# Patient Record
Sex: FEMALE | Race: WHITE
[De-identification: names, ages, dates, MRNs, and addresses within clinical notes are randomized per-mention and may not be internally consistent; named-entity substitution may affect disease eponyms.]

---

## 2017-09-09 ENCOUNTER — HOSPITAL ENCOUNTER (EMERGENCY)
Dept: HOSPITAL 25 - UCCORT | Age: 67
Discharge: HOME | End: 2017-09-09
Payer: MEDICARE

## 2017-09-09 VITALS — SYSTOLIC BLOOD PRESSURE: 145 MMHG | DIASTOLIC BLOOD PRESSURE: 81 MMHG

## 2017-09-09 DIAGNOSIS — G45.9: ICD-10-CM

## 2017-09-09 DIAGNOSIS — E66.9: ICD-10-CM

## 2017-09-09 DIAGNOSIS — H53.8: ICD-10-CM

## 2017-09-09 DIAGNOSIS — E78.5: ICD-10-CM

## 2017-09-09 DIAGNOSIS — R51: ICD-10-CM

## 2017-09-09 DIAGNOSIS — J44.9: ICD-10-CM

## 2017-09-09 DIAGNOSIS — Z87.891: ICD-10-CM

## 2017-09-09 DIAGNOSIS — R23.3: Primary | ICD-10-CM

## 2017-09-09 DIAGNOSIS — I10: ICD-10-CM

## 2017-09-09 DIAGNOSIS — Z88.7: ICD-10-CM

## 2017-09-09 DIAGNOSIS — Z88.8: ICD-10-CM

## 2017-09-09 PROCEDURE — 99212 OFFICE O/P EST SF 10 MIN: CPT

## 2017-09-09 PROCEDURE — G0463 HOSPITAL OUTPT CLINIC VISIT: HCPCS

## 2017-09-09 NOTE — UC
Eye Complaint HPI





- HPI Summary


HPI Summary: 


Bruising and swelling this morning starting after 7 AM.





- History of Current Complaint


Chief Complaint: UCEye


Stated Complaint: LEFT EYE BRUISING,HEADACHE


Time Seen by Provider: 09/09/17 17:52


Hx Obtained From: Patient


Hx Last Menstrual Period: n/a


Onset/Duration: Sudden Onset - unaware of any trauma., Still Present, Worse 

Since - onset with more bruising.


Timing: Constant


Severity Initially: Mild


Severity Currently: Mild


Pain Intensity: 0


Location of Injury: Eye Lid (upper), Periorbital - on the medial aspect


Aggravating Factor(s): Nothing


Alleviating Factor(s): Nothing


Associated Signs And Symptoms: Positive: Vision Impairment Left - just 

transient blurring that clears with blinking.





- Risk Factors


Penetrating Injury Risk Factor: Negative


Globe Rupture Risk Factors: Negative


Acute Glaucoma Risk Factors: Negative


Optic Artery Occlusion Risk Factors: Heart Disease





- Allergies/Home Medications


Allergies/Adverse Reactions: 


 Allergies











Allergy/AdvReac Type Severity Reaction Status Date / Time


 


Pseudoephedrine Allergy Severe racing Verified 09/09/17 17:37





[From Sudafed]   heartbeat  


 


Tetanus Toxoid Allergy Severe rash and Verified 09/09/17 17:37





   edema  











Home Medications: 


 Home Medications





Calcium Carbonate-Cholecalcife [Calcium 600 + D 600-200 mg-Unit] 1 tab PO BID 09 /09/17 [History Confirmed 09/09/17]


Furosemide TAB* [Lasix TAB*] 20 mg PO DAILY 09/09/17 [History Confirmed 09/09/17

]


Multivitamins/Minerals TAB* [Thera M Plus TAB*] 1 tab PO DAILY 09/09/17 [

History Confirmed 09/09/17]











PMH/Surg Hx/FS Hx/Imm Hx


Endocrine History: Dyslipidemia


Cardiovascular History: Hypertension


Respiratory History: COPD


Neurological History: TIA





- Surgical History


Surgical History: Yes


Surgery Procedure, Year, and Place: Tonsilectomy, knee surgery, tubal, 

hysterectomy, ehsan





- Family History


Known Family History: Positive: Cardiac Disease, Hypertension, Diabetes





- Social History


Occupation: Retired


Lives: With Family


Alcohol Use: None


Substance Use Type: None


Smoking Status (MU): Former Smoker


Type: Cigarettes


Amount Used/How Often: 1 PPD


Length of Time of Smoking/Using Tobacco: 46 Years


Have You Smoked in the Last Year: No


When Did the Patient Quit Smoking/Using Tobacco: 4 YRS





- Immunization History


Most Recent Influenza Vaccination: October 2015


Most Recent Pneumonia Vaccination: October 2015





Review of Systems


Skin: Bruising


Eyes: Blurred Vision


Neurological: Headache - frontal headache early this afternoon that has 

resolved after tylenol and eating.


Is Patient Immunocompromised?: No


All Other Systems Reviewed And Are Negative: Yes





Physical Exam


Triage Information Reviewed: Yes


Appearance: Well-Appearing, No Pain Distress, Obese


Vital Signs: 


 Initial Vital Signs











Temp  98.1 F   09/09/17 17:38


 


Pulse  81   09/09/17 17:38


 


Resp  18   09/09/17 17:38


 


BP  145/81   09/09/17 17:38


 


Pulse Ox  100   09/09/17 17:38











Vital Signs Reviewed: Yes


Eyes: Positive: Conjunctiva Clear, Other: - Bilateral cataracts. Fundiscopic 

exam normal. Discs sharp.


ENT: Positive: Pharynx normal, TMs normal


Neck exam: Normal


Respiratory: Positive: Lungs clear, Decreased breath sounds


Cardiovascular Exam: Normal


Musculoskeletal Exam: Normal


Neurological Exam: Normal


Psychological Exam: Normal


Skin: Positive: Other - bruising over left upper eyelid medially, tracking down 

onto the lower lid





Eye Complaint Course/Dx





- Differential Dx/Diagnosis


Differential Diagnosis/HQI/PQRI: Conjunctivitis, Periorbital Cellulitis, Uveitis


Provider Diagnoses: Ecchymosis eyelid





Discharge





- Discharge Plan


Condition: Stable


Disposition: HOME


Patient Education Materials:  Ecchymosis (ED), Hematoma (ED)


Additional Instructions: 


Use ice packs to the area and rest. To the ER if worse.

## 2019-08-09 ENCOUNTER — HOSPITAL ENCOUNTER (EMERGENCY)
Dept: HOSPITAL 25 - UCCORT | Age: 69
Discharge: HOME | End: 2019-08-09
Payer: MEDICARE

## 2019-08-09 VITALS — SYSTOLIC BLOOD PRESSURE: 133 MMHG | DIASTOLIC BLOOD PRESSURE: 76 MMHG

## 2019-08-09 DIAGNOSIS — Z87.891: ICD-10-CM

## 2019-08-09 DIAGNOSIS — J44.9: ICD-10-CM

## 2019-08-09 DIAGNOSIS — L03.116: Primary | ICD-10-CM

## 2019-08-09 PROCEDURE — 99212 OFFICE O/P EST SF 10 MIN: CPT

## 2019-08-09 PROCEDURE — G0463 HOSPITAL OUTPT CLINIC VISIT: HCPCS

## 2019-08-09 NOTE — UC
Lower Extremity/Ankle HPI





- HPI Summary


HPI Summary: 





Pt presents with c/o of worsening redness and pain on left mid anterior shin.  

Pt states that she tripped walking up stairs in RV two weeks ago and hit left 

shin on edge of step.  Pt states that area has become increasingly red and 

tender over the last two weeks. 





- History of Current Complaint


Chief Complaint: UCLowerExtremity


Stated Complaint: LEFT LEG INJURY


Time Seen by Provider: 08/09/19 12:06


Hx Obtained From: Patient


Hx Last Menstrual Period: n/a


Pregnant?: No


Onset/Duration: Gradual Onset, Still Present, Worse Since - osnet


Severity Initially: Mild


Severity Currently: Moderate


Pain Intensity: 5


Aggravating Factor(s): Standing, Ambulation


Alleviating Factor(s): Nothing


Able to Bear Weight: Yes





- Risk Factors


Gout Risk Factors: Obesity


DVT Risk Factors: Negative


Septic Arthritis Risk Factor: Negative





- Allergies/Home Medications


Allergies/Adverse Reactions: 


 Allergies











Allergy/AdvReac Type Severity Reaction Status Date / Time


 


pseudoephedrine Allergy  Tachycardia Verified 08/09/19 12:18





[From Sudafed]     


 


Tetanus Vaccines and Toxoid Allergy  Rash Verified 08/09/19 12:18











Home Medications: 


 Home Medications





Calcium Carbonate [Calcium] 500 mg PO DAILY 08/09/19 [History Confirmed 08/09/19

]


Fluticasone-Salmeterol 500-50* [Advair Diskus 500-50*] 1 puff INH BID 08/09/19 [

History Confirmed 08/09/19]


Ramipril 1.25 mg PO DAILY 08/09/19 [History Confirmed 08/09/19]


Umeclidinium Bromide [Incruse Ellipta] 62.5 mcg IH DAILY 08/09/19 [History 

Confirmed 08/09/19]











PMH/Surg Hx/FS Hx/Imm Hx


Previously Healthy: Yes


Cardiovascular History: Cardiac Disease


Respiratory History: COPD





- Surgical History


Surgical History: Yes


Surgery Procedure, Year, and Place: Tonsilectomy, knee surgery, tubal, 

hysterectomy, ehsan





- Family History


Known Family History: Positive: Cardiac Disease, Hypertension, Diabetes





- Social History


Occupation: Retired


Lives: With Family


Alcohol Use: Occasionally


Substance Use Type: None


Smoking Status (MU): Former Smoker


Type: Cigarettes


Amount Used/How Often: 1 PPD


Length of Time of Smoking/Using Tobacco: 46 Years


Have You Smoked in the Last Year: No


When Did the Patient Quit Smoking/Using Tobacco: 4 YRS





- Immunization History


Most Recent Influenza Vaccination: October 2015


Most Recent Pneumonia Vaccination: October 2015





Review of Systems


All Other Systems Reviewed And Are Negative: Yes


Constitutional: Positive: Negative


Skin: Positive: Bruising, Other - erythema, tenderness,


Eyes: Positive: Negative


ENT: Positive: Negative


Respiratory: Positive: Negative


Cardiovascular: Positive: Negative


Gastrointestinal: Positive: Negative


Genitourinary: Positive: Negative


Motor: Positive: Negative


Neurovascular: Positive: Negative


Musculoskeletal: Positive: Myalgia - left shin


Neurological: Positive: Negative


Psychological: Positive: Negative


Is Patient Immunocompromised?: No





Physical Exam


Triage Information Reviewed: Yes


Appearance: Obese


Vital Signs: 


 Initial Vital Signs











Temp  97.8 F   08/09/19 12:10


 


Pulse  85   08/09/19 12:10


 


Resp  16   08/09/19 12:10


 


BP  133/76   08/09/19 12:10


 


Pulse Ox  100   08/09/19 12:10











Vital Signs Reviewed: Yes


Eye Exam: Normal


ENT Exam: Normal


ENT: Positive: Hearing grossly normal


Dental Exam: Normal


Neck exam: Normal


Respiratory Exam: Normal


Musculoskeletal Exam: Normal


Neurological Exam: Normal


Psychological Exam: Normal


Skin Exam: Other - erythema ~ 6 cm in diamter, mild warmth greater than 

surroudning skin, healing bruise





Diagnostics





- Radiology


  ** No standard instances


Radiology Interpretation Completed By: Radiologist - REPORT AND IMPRESSION: #. 

Negative for fracture or evidence for stress reaction. #. Normal articular 

alignment. #. Mild osteoarthritis at the medial joint compartment of the knee 

with associated chondrocalcinosis at the meniscus as well as subchondral 

sclerosis at the tibial plateau.  #. Nonfocal moderate soft tissue edema. 

Negative for subcutaneous emphysema.





Lower Extremity Course/Dx





- Differential Dx/Diagnosis


Differential Diagnosis/HQI/PQRI: Cellulitis, Contusion, Fracture (Closed)


Provider Diagnosis: 


 Cellulitis of left anterior lower leg








Discharge





- Sign-Out/Discharge


Documenting (check all that apply): Patient Departure


All imaging exams completed and their final reports reviewed: No Studies





- Discharge Plan


Condition: Stable


Disposition: HOME


Prescriptions: 


Cephalexin CAP* [Keflex 500 CAP*] 500 mg PO Q12H #10 cap


Patient Education Materials:  Cellulitis (ED)


Referrals: 


Gina Arredondo PA [Primary Care Provider] - If Needed





- Billing Disposition and Condition


Condition: STABLE


Disposition: Home

## 2019-08-11 NOTE — UC
- Progress Note


Progress Note: 





Patient Name:         LILI JONES                                            

                        Medical Record#: [id number]


Ordering Physician: Lynne Cespedes NP                                  

                        Acct.#: [id number]


:     1950         Age: 68   Sex: F                                   

                        Location: [facility]


Exam Date: 19 1227                                                       

                        ADM Status: REG ER


Order Information:                         TIBIA FIBULA LEFT


Accession Number:                          [id number]


CPT:                                       [id number]


Indication: Anterior lateral LEFT lower leg pain post fall one week ago.





Comparison: May 07, 2012





Technique: AP and lateral views LEFT lower leg.





REPORT AND IMPRESSION: 


#.  Negative for fracture or evidence for stress reaction. 


#. Normal articular alignment. 


#.  Mild osteoarthritis at the medial joint compartment of the knee with 

associated


chondrocalcinosis at the meniscus as well as subchondral sclerosis at the 

tibial plateau. 





#. Nonfocal moderate soft tissue edema. Negative for subcutaneous emphysema.








____________________________________________________________


<Electronically signed by Vince Fall MD in OV>  19 1257


Dictated By: Vince Fall MD


Dictated Date/Time: 19 1257


Transcribed Date/Time: 19 1254


Copy to:











CC:Lynne Cespedes NP; Gina CHAMBERS; Roslyn Yuan MD


Imaging - [facility]                                 Imaging [facility] 


[address]                                       [address] [address]


ph [phone])                                     ph ([phone])        

                                        ph ([phone]) 












































This report is only to be considered final once signed by the Provider(s) as 

displayed in the "<Electronically Signed by >" field (s). Absence of a 


signature indicates the report is in a draft status and still needs to be 

finalized. In the event this document was created by someone other than the 


signing Provider, the individual initiating the document will be listed in the 

"Entered by:" or "Dictated by:" fields.


                                                                 1 of 1








Course/Dx





- Diagnoses


Provider Diagnoses: 


 Cellulitis of left anterior lower leg








Discharge





- Sign-Out/Discharge


Documenting (check all that apply): Post-Discharge Follow Up


All imaging exams completed and their final reports reviewed: Yes





- Discharge Plan


Condition: Stable


Disposition: HOME


Prescriptions: 


Cephalexin CAP* [Keflex 500 CAP*] 500 mg PO Q12H #10 cap


Patient Education Materials:  Cellulitis (ED)


Referrals: 


Gina Arredondo PA [Primary Care Provider] - If Needed





- Billing Disposition and Condition


Condition: STABLE


Disposition: Home

## 2020-01-27 ENCOUNTER — HOSPITAL ENCOUNTER (EMERGENCY)
Dept: HOSPITAL 25 - UCCORT | Age: 70
Discharge: HOME | End: 2020-01-27
Payer: MEDICARE

## 2020-01-27 VITALS — DIASTOLIC BLOOD PRESSURE: 80 MMHG | SYSTOLIC BLOOD PRESSURE: 141 MMHG

## 2020-01-27 DIAGNOSIS — J20.9: ICD-10-CM

## 2020-01-27 DIAGNOSIS — Z87.891: ICD-10-CM

## 2020-01-27 DIAGNOSIS — Z88.8: ICD-10-CM

## 2020-01-27 DIAGNOSIS — Z88.7: ICD-10-CM

## 2020-01-27 DIAGNOSIS — J44.0: Primary | ICD-10-CM

## 2020-01-27 PROCEDURE — G0463 HOSPITAL OUTPT CLINIC VISIT: HCPCS

## 2020-01-27 PROCEDURE — 99212 OFFICE O/P EST SF 10 MIN: CPT

## 2020-01-27 NOTE — XMS REPORT
Continuity of Care Document (CCD)

 Created on:2020



Patient:Stefania Acevedo

Sex:Female

:1950

External Reference #:[id number]





Demographics







 Address  [address] [place] [address]

 

 Mobile Phone  [phone]

 

 Preferred Language  en

 

 Marital Status  Not  or 

 

 Jewish Affiliation  Unknown

 

 Race  White

 

 Ethnic Group  Not  or 









Author







 Name  REYES Phillips (transmitted by agent of provider Arik Hurley)

 

 Address  [address] [place] [address]









Care Team Providers







 Name  Role  Phone

 

 Gina Arredondo RPA - Medical  Care Team Information   [phone]
[phone]

 

 Kali Loco M.D. - Internal  Care Team Information   [phone]



 Medicine    









Problems







 Active Problems  Provider  Date

 

 Obstructive sleep apnea syndrome  REYES Phillips  Onset: 2019









 Note: Apap









 Coronary atherosclerosis  Gina Arredondo PA  Onset: 2019

 

 Essential hypertension  Gina Arredondo PA  Onset: 2019

 

 Chronic obstructive lung disease  Gina Arredondo PA  Onset: 2019

 

 Hyperlipidemia  iGna Arredondo PA  Onset: 2019

 

 H/O: TIA  Gina Arredondo PA  Onset: 2019

 

 Gastroesophageal reflux disease  Gina Arredondo PA  Onset: 2019

 

 Hyperglycemia  Gina Arredondo PA  Onset: 2019









 Note: noted 









 History of adenomatous polyp of colon  Gina Arredondo PA  Onset: 2019









 Note: high grade  2014









 Gastric hemorrhage  Gina Arredondo PA  Onset: 2019









 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









 Clostridium difficile infection  Gina Arredondo PA  Onset: 2019

 

 Chronic diastolic heart failure  Gina Arredondo PA  Onset: 2019

 

 Diverticulitis of sigmoid colon  Gina Arredondo PA  Onset: 2019









 Note: 2019









 Polyp of colon  Gina Arredondo, PA  Onset: 2019









 Note: tubular adenomas 2019







Social History







 Type  Date  Description  Comments

 

 Birth Sex    Unknown  

 

 ETOH Use    Consumed 2 glasses of wine  



     per day in the past  

 

 Tobacco Use  Start: Unknown End:  Patient is a former smoker  Quit  pack 
years



   Unknown    40

 

 Smoking Status  Reviewed: 20  Patient is a former smoker  Quit 2010 pack 
years



       40







Allergies, Adverse Reactions, Alerts







 Active Allergies  Reaction  Severity  Comments  Date

 

 Tetanus        2019

 

 Pseudoephedrine        2019

 

 Sudafed        2019

 

 Lipitor        2019

 

 Adhesive        2019







Medications







 Active Medications  SIG  Qnty  Indications  Ordering  Date



         Provider  

 

 Amoxicillin/Clavulan  one pill with food  20tabs  K57.32  Noman  2020



 ate Potassium  twice a day      MD Chidi  



           



 875-125mg Tablets          



           

 

 Flagyl  1 tab by mouth  15tabs  K57.32  Noman  2020



       500mg Tablets  every 8 hours      MD Chidi  



           

 

 Oxygen Order  Portable system    R09.02  Noman  10/30/2019



   requiring tank      MD Chidi  



   delivery 4 liters        



   via nc while        



   ambulatory 3 liters        



   via nc while        



   sleeping        

 

 Order  Oxygen @ 3L/min,      Noman  2019



   continuous      MD Chidi  

 

 Fluticasone  2 sprays to each      Noman  2019



 Propionate  nare every day      MD Chidi  



           50mcg/Act          



 Suspension          



           

 

 Ramipril  Take 1 Capsule By  90caps  I10  Noman  2019



         1.25mg  Mouth Every Day      MD Chidi  



 Capsules          



           

 

 Oxygen Thru [facility]  @ 3 liters via NC,      Noman  2019



 Homecare  continuous  And      MD Chidi  



   with c-pap        

 

 Albuterol Sulfate  1 application every  75ml    Noman  2019



   4 hours as needed      MD Chidi  



 (2.5mg/3ML) 0.083%          



 Nebulizer          



           

 

 Aspirin Ec Low Dose  1 by mouth every  30tabs    Noman  2019



   day      MD Chidi  



 81mg Tablets           



           

 

 Vitamin C  1 by mouth every      Noman  2019



          1000mg  day      MD Chidi  



 Tablets          



           

 

 Calcium 500 +D  1 by mouth twice a  180tabs    Noman  2019



   day      MD Chidi  



 [id number]          



 Tablets          



           

 

 Pantoprazole Sodium  Take 1 Tablet By  180tabs    Noman  2019



   Mouth Twice Daily      MD Chidi  



 40mg Tablets           



           

 

 Montelukast Sodium  1 by mouth every  90tabs    Noman  2019



   day      MD Chidi  



 10mg Tablets          



           

 

 Pravastatin Sodium  Take 1 Tablet By  90tabs    Noman  2019



   Mouth Every Evening      MD Chidi  



 40mg Tablets          



           

 

 Furosemide  1 by mouth every      Noman  2019



           20mg  day      MD Chidi  



 Tablets          



           

 

 Daliresp  1 by mouth every      Kali Loco,  



         500mcg  day      M.D.  



 Tablets          



           

 

 Incruse Ellipta  inhale 1 puff by  90units    [place]  



   mouth every 24      MD Chidi  



 62.5mcg/Inh Aerosol  hours        



           

 

 Proair HFA  1-2 inhalations  8.500gm    Noman  



   every 4-6 hours as      MD Chidi  



 108(90Base) mcg/Act  needed        



 Aerosol          



           









 History Medications









 Azithromycin  2 tabs by  11tabs  N83.201  Noman [place],  10/07/2019 -



             250mg  mouth today      MD  10/20/2019



 Tablets  then 1 tab by        



   mouth daily        







Immunizations







 Description

 

 No Information Available







Vital Signs







 Date  Vital  Result  Comment

 

 2020  2:02pm  Height  62.5 inches  5'2.50"









 Weight  227.12 lb  

 

 Heart Rate  96 /min  

 

 BP Systolic Sitting  136 mmHg  

 

 BP Diastolic Sitting  72 mmHg  

 

 Body Temperature  98.5 F  

 

 O2 % BldC Oximetry  97 %  

 

 BMI (Body Mass Index)  40.9 kg/m2  









 10/30/2019  9:48am  Weight  221.12 lb  









 Heart Rate  80 /min  

 

 BP Systolic  118 mmHg  

 

 BP Diastolic  80 mmHg  

 

 Body Temperature  98.8 F  

 

 O2 % BldC Oximetry  98 %  







Results







 Description

 

 No Information Available







Procedures







 Date  Code  Description  Status

 

 2019  32811000  Colonoscopy  Completed

 

 2019  37913509  Mammogram  Completed

 

 2018  68062333  Mammogram  Completed







Medical Devices







 Description

 

 No Information Available







Encounters







 Type  Date  Location  Provider  Dx  Diagnosis

 

 Office Visit  10/30/2019  [facility]  Gina  N83.201  Unspecified 
ovarian



   9:30a    [place]    cyst, right side









 J44.9  Chronic obstructive pulmonary disease, unspecified









 Office Visit  10/07/2019  1:27p  [facility]  Gina  J44.0  Chr 
obstructive



     Walk-in [place]  [place]    pulmon disease



     [facility]      with (acute) lower



           resp infct









 R09.1  Pleurisy









 Office Visit  2019  9:30a  [facility]  Gina  J44.9  Chronic



     [place]  [place]    obstructive



           pulmonary disease,



           unspecified









 N83.201  Unspecified ovarian cyst, right side

 

 K57.32  Dvtrcli of lg int w/o perforation or abscess w/o bleeding









 Office Visit  2019  1:00p  [facility]  Gina  K57.32  Dvtrcli of lg 
int



     Care  [place]    w/o perforation or



           abscess w/o



           bleeding









 N83.201  Unspecified ovarian cyst, right side







Assessments







 Date  Code  Description  Provider

 

 2020  K57.32  Diverticulitis of large intestine without  Gina [place]
, [place]



     perforation or abscess without bleeding  

 

 10/30/2019  N83.201  Unspecified ovarian cyst, right side  Gina [place], [place]

 

 10/30/2019  J44.9  Chronic obstructive pulmonary disease,  Gina [place], [place]



     unspecified  

 

 10/07/2019  J44.0  Chronic obstructive pulmonary disease with  Gina [place]
, [place]



     (acute) lower respiratory infection  

 

 10/07/2019  R09.1  Pleurisy  Gina [place], [place]

 

 2019  J44.9  Chronic obstructive pulmonary disease,  Gina [place], [place]



     unspecified  

 

 2019  N83.201  Unspecified ovarian cyst, right side  Gina [place], [place]

 

 2019  K57.32  Diverticulitis of large intestine without  Gina [place]
, [place]



     perforation or abs  

 

 2019  K57.32  Diverticulitis of large intestine without  Gina [place]
, [place]



     perforation or abs  

 

 2019  N83.201  Unspecified ovarian cyst, right side  Gina [place], [place]







Plan of Treatment

Future Appointment(s):2020 10:00 am - Gina Arredondo PA at [facility] 
Care2020 - Gina [place], PAK57.32 Diverticulitis of large intestine 
without perforation or abscess without bleedingNew Medication:Amoxicillin/
Clavulanate Potassium 875-125 mg - one pill with food twice a dayFlagyl 500 mg 
- 1 tab by mouth every 8 hoursNew Labs:CBC W/Auto Diff, Ordered: 20



Functional Status







 Functional Condition  Comment  Date  Status

 

 Cpap      Active

 

 Nebulizer      Active

 

 Oxygen      Active







Mental Status







 Description

 

 No Information Available







Referrals







 Description

 

 No Information Available

## 2020-03-23 ENCOUNTER — HOSPITAL ENCOUNTER (EMERGENCY)
Dept: HOSPITAL 25 - UCCORT | Age: 70
Discharge: HOME HEALTH | End: 2020-03-23
Payer: MEDICARE

## 2020-03-23 VITALS — DIASTOLIC BLOOD PRESSURE: 87 MMHG | SYSTOLIC BLOOD PRESSURE: 157 MMHG

## 2020-03-23 DIAGNOSIS — Z99.81: ICD-10-CM

## 2020-03-23 DIAGNOSIS — Z87.891: ICD-10-CM

## 2020-03-23 DIAGNOSIS — J44.9: ICD-10-CM

## 2020-03-23 DIAGNOSIS — Z20.828: ICD-10-CM

## 2020-03-23 DIAGNOSIS — Z79.82: ICD-10-CM

## 2020-03-23 DIAGNOSIS — Z79.51: ICD-10-CM

## 2020-03-23 DIAGNOSIS — Z88.7: ICD-10-CM

## 2020-03-23 DIAGNOSIS — R06.02: Primary | ICD-10-CM

## 2020-03-23 DIAGNOSIS — Z88.8: ICD-10-CM

## 2020-03-23 PROCEDURE — U0002 COVID-19 LAB TEST NON-CDC: HCPCS

## 2020-03-23 PROCEDURE — G0463 HOSPITAL OUTPT CLINIC VISIT: HCPCS

## 2020-03-23 PROCEDURE — 99212 OFFICE O/P EST SF 10 MIN: CPT

## 2020-03-23 NOTE — XMS REPORT
Continuity of Care Document (CCD)

 Created on:2020



Patient:Stefania Acevedo

Sex:Female

:1950

External Reference #:[id number]





Demographics







 Address  [address] [place] [address]

 

 Mobile Phone  [phone]

 

 Preferred Language  en

 

 Marital Status  Not  or 

 

 Restorationism Affiliation  Unknown

 

 Race  White

 

 Ethnic Group  Not  or 









Author







 Name  REYES Phillips (transmitted by agent of provider Kayy Bentley)

 

 Address  [address] [place] [address]









Care Team Providers







 Name  Role  Phone

 

 Gina Arredondo RPA - Medical  Care Team Information   [phone]
[phone]

 

 Kali Loco M.D. - Internal  Care Team Information   [phone]



 Medicine    

 

 Kathy Brady MD -  Care Team Information   [phone]



 Gastroenterology    









Problems







 Active Problems  Provider  Date

 

 Obstructive sleep apnea syndrome  REYES Phillips  Onset: 2019









 Note: Apap









 Coronary atherosclerosis  Gina Arredondo PA  Onset: 2019

 

 Essential hypertension  Gina Arredondo PA  Onset: 2019

 

 Chronic obstructive lung disease  Gina Arredondo PA  Onset: 2019

 

 Hyperlipidemia  Gina Arredondo PA  Onset: 2019

 

 H/O: TIA  Gina Arredondo PA  Onset: 2019

 

 Gastroesophageal reflux disease  Gina Arredondo PA  Onset: 2019

 

 Hyperglycemia  Gina Arredondo PA  Onset: 2019









 Note: noted 









 History of adenomatous polyp of colon  Gina Arredondo PA  Onset: 2019









 Note: high grade  2014









 Gastric hemorrhage  Gina Arredondo PA  Onset: 2019









 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









 Clostridium difficile infection  Gina Arredondo PA  Onset: 2019

 

 Chronic diastolic heart failure  Gina Arredondo PA  Onset: 2019

 

 Diverticulitis of sigmoid colon  Gina Arredondo PA  Onset: 2019









 Note: 2019









 Polyp of colon  Gina Arredondo, PA  Onset: 2019









 Note: tubular adenomas 2019







Social History







 Type  Date  Description  Comments

 

 Birth Sex    Unknown  

 

 ETOH Use    Consumed 2 glasses of wine  



     per day in the past  

 

 Tobacco Use  Start: Unknown End:  Patient is a former smoker  Quit 2010 pack 
years



   Unknown    40

 

 Smoking Status  Reviewed: 20  Patient is a former smoker  Quit 2010 pack 
years



       40







Allergies, Adverse Reactions, Alerts







 Active Allergies  Reaction  Severity  Comments  Date

 

 Tetanus        2019

 

 Pseudoephedrine        2019

 

 Sudafed        2019

 

 Lipitor        2019

 

 Adhesive        2019







Medications







 Active Medications  SIG  Qnty  Indications  Ordering  Date



         Provider  

 

 Acidophilus  1 by mouth twice a      Noman  2020



 Lactobacillus  day      MD Chidi  



              Capsules          



           

 

 Furosemide  1 by mouth every      Noman  2019



          20mg Tablets  day      MD Chidi  



           

 

 Pravastatin Sodium  Take 1 Tablet By  90tabs    Noman  2019



                  40mg  Mouth Every      MD Chidi  



 Tablets  Evening        



           

 

 Montelukast Sodium  1 by mouth every  90tabs    Noman  2019



                  10mg  day      MD Chidi  



 Tablets          



           

 

 Pantoprazole Sodium  Take 1 Tablet By  180tabs    Noman  2019



   Mouth Twice Daily      MD Chidi  



 40mg Tablets           



           

 

 Calcium 500 +D  1 by mouth twice a  180tabs    Noman  2019



   day      MD Chidi  



 [id number]          



 Tablets          



           

 

 Vitamin C  1 by mouth every      Noman  2019



         1000mg  day      MD Chidi  



 Tablets          



           

 

 Aspirin Ec Low Dose  1 by mouth every  30tabs    Noman  2019



   day      MD Chidi  



 81mg Tablets           



           

 

 Albuterol Sulfate  1 application  75ml    Noman  2019



   every 4 hours as      MD Chidi  



 (2.5mg/3ML) 0.083%  needed        



 Nebulizer          



           

 

 Oxygen Thru [facility]  @ 3 liters via NC,      Noman  2019



 Homecare  continuous  And      MD Chidi  



   with c-pap        

 

 Ramipril  Take 1 Capsule By  90caps  I10  2019



        1.25mg  Mouth Every Day      MD Chidi  



 Capsules          



           

 

 Fluticasone  2 sprays to each      Noman  2019



 Propionate  nare every day      MD Chidi  



          50mcg/Act          



 Suspension          



           

 

 Benefiber  daily      Unknown  



          Powder          



           

 

 Probiotic Acidophilus  1 by mouth every      Unknown  



   day -bid        



 Capsules          



           

 

 Proair HFA  1-2 inhalations  8.500gm    Noman  



          108(90Base)  every 4-6 hours as      MD Chidi  



 mcg/Act Aerosol  needed        



           

 

 Incruse Ellipta  inhale 1 puff by  90units    Noman  



   mouth every 24      MD Chidi  



 62.5mcg/Inh Aerosol  hours        



           

 

 Daliresp  1 by mouth every      Kheti, Kali,  



        500mcg Tablets  day      M.D.  



           









 History Medications









 Ciprofloxacin HCL  1 tab by mouth      Kathy Brady  2020 -



                 500mg  twice a day      F., MD  2020



 Tablets          



           

 

 Metronidazole  one tablet by      Kathy Brady  2020 -



             500mg  mouth 3 times      F., MD  2020



 Tablets  daily for 7 days        



           

 

 Prednisone  4 tablets by mouth      Unknown  2020 -



          10mg Tablets  for 3 days,3        2020



   tablets by mouth        



   for 3 days, 2        



   tablets by mouth        



   for 3 days, 1        



   tablet by mouth        



   for 3 days        

 

 Amoxicillin/Clavulana  one pill with food  20tabs  K57.32  Noman  2020 -



 te Potassium  twice a day      MD Chidi  2020



            875-125mg          



 Tablets          



           

 

 Flagyl  1 tab by mouth  15tabs  K57.32  Noman  2020 -



      500mg Tablets  every 8 hours      MD Chidi  2020



           

 

 Oxygen Order  Portable system    R09.02  Noman  10/30/2019 -



   requiring tank      MD Chidi  2020



   delivery 4 liters        



   via nc while        



   ambulatory 3        



   liters via nc        



   while sleeping        

 

 Azithromycin  2 tabs by mouth  11tabs  N83.201  Noman  10/07/2019 -



            250mg  today then 1 tab      MD Chidi  10/20/2019



 Tablets  by mouth daily        



           

 

 Order  Oxygen @ 3L/min,      Noman  2019 -



   continuous      MD Chidi  2020







Immunizations







 Description

 

 No Information Available







Vital Signs







 Date  Vital  Result  Comment

 

 2020 10:56am  Height  62.5 inches  5'2.50"









 Weight  234.25 lb  

 

 Heart Rate  90 /min  

 

 BP Systolic Sitting  148 mmHg  

 

 BP Diastolic Sitting  80 mmHg  

 

 Body Temperature  98.2 F  

 

 O2 % BldC Oximetry  95 %  O2 on 3L

 

 BMI (Body Mass Index)  42.2 kg/m2  









 2020 11:32am  Height  62.5 inches  5'2.50"









 Weight  219.12 lb  

 

 Heart Rate  93 /min  

 

 BP Systolic Sitting  136 mmHg  

 

 BP Diastolic Sitting  80 mmHg  

 

 O2 % BldC Oximetry  97 %  on 3L O2 via nasal cannula

 

 BMI (Body Mass Index)  39.4 kg/m2  







Results







 Test  Acquired Date  Facility  Test  Result  H/L  Range  Note

 

 CBC W/Auto  2020  [facility]  White Blood  9.0 10^3/uL  
Normal  3.5-10.8  



 Diff    [address]  Count        



     [place] [address] [phone]          









 Red Blood Count  4.60 10^6/uL  Normal  3.70-4.87  

 

 Hemoglobin  12.1 g/dL  Normal  12.0-16.0  

 

 Hematocrit  38 %  Normal  35-47  

 

 Mean Corpuscular Volume  83 fL  Normal  80-97  

 

 Mean Corpuscular Hemoglobin  26 pg  Low  27-31  

 

 Mean Corpuscular HGB Conc  32 g/dL  Normal  31-36  

 

 Red Cell Distribution Width  15 %  Normal  10-15  

 

 Platelet Count  285 10^3/uL  Normal  150-450  

 

 Mean Platelet Volume  10.0 fL  Normal  7.4-10.4  

 

 Abs Neutrophils  6.3 10^3/uL  Normal  1.5-7.7  

 

 Abs Lymphocytes  1.6 10^3/uL  Normal  1.0-4.8  

 

 Abs Monocytes  0.9 10^3/uL  High  0-0.8  

 

 Abs Eosinophils  0.1 10^3/uL  Normal  0-0.6  

 

 Abs Basophils  0.1 10^3/uL  Normal  0-0.2  

 

 Abs Nucleated RBC  0.0 10^3/uL      

 

 Granulocyte %  70.2 %      

 

 Lymphocyte %  17.8 %      

 

 Monocyte %  10.0 %      

 

 Eosinophil %  1.4 %      

 

 Basophil %  0.6 %      

 

 Nucleated Red Blood Cells %  0.0      







Procedures







 Date  Code  Description  Status

 

 2019  41563097  Colonoscopy  Completed

 

 2019  [id number]  Mammogram  Completed

 

 2018  [id number]  Mammogram  Completed







Medical Devices







 Description

 

 No Information Available







Encounters







 Type  Date  Location  Provider  Dx  Diagnosis

 

 Office Visit  2020  [facility]  Gina  K57.32  Dvtrcli of lg int



   11:00a    [place]    w/o perforation or



           abscess w/o



           bleeding

 

 Office Visit  2020  [facility]  Gina  J44.0  Chr obstructive



   11:30a    [place]    pulmon disease with



           (acute) lower resp



           infct

 

 Office Visit  2020  [facility]  Gina  K57.32  Dvtrcli of lg int



   2:00p    [place]    w/o perforation or



           abscess w/o



           bleeding

 

 Office Visit  10/30/2019  [facility]  Gina  N83.201  Unspecified 
ovarian



   9:30a    [place]    cyst, right side









 J44.9  Chronic obstructive pulmonary disease, unspecified









 Office Visit  10/07/2019  1:27p  [facility]  Gina  J44.0  Chr 
obstructive



     Walk-in at  [place]    pulmon disease



     [facility]      with (acute) lower



           resp infct









 R09.1  Pleurisy







Assessments







 Date  Code  Description  Provider

 

 2020  K57.32  Diverticulitis of large intestine without  [place]



     perforation or abscess without bleeding  

 

 2020  J44.0  Chronic obstructive pulmonary disease with  Gina [place]



     (acute) lower respiratory infection  

 

 2020  K57.32  Diverticulitis of large intestine without  [place]ina [place]



     perforation or abscess without bleeding  

 

 10/30/2019  N83.201  Unspecified ovarian cyst, right side  [place]ina [place]

 

 10/30/2019  J44.9  Chronic obstructive pulmonary disease,  [place]ina [place]



     unspecified  

 

 10/07/2019  J44.0  Chronic obstructive pulmonary disease with  [place]ina [place]



     (acute) lower respiratory infection  

 

 10/07/2019  R09.1  Pleurisy  [place]ina [place]







Plan of Treatment

Future Appointment(s):2020 10:00 am - Gina Arredondo PA at [facility]2020 - Gina Arnulfo, PAK57.32 Diverticulitis of large intestine 
without perforation or abscess without bleedingComments:Completing Flagyl and 
Cipro treatment Notified Dr Brady's office on 3/4/20, lab results sent to the 
officeAllNew Medication:Acidophilus Lactobacillus   - 1 by mouth twice a day



Functional Status







 Functional Condition  Comment  Date  Status

 

 Cpap      Active

 

 Nebulizer      Active

 

 Oxygen      Active







Mental Status







 Description

 

 No Information Available







Referrals







 Description

 

 No Information Available

## 2020-03-23 NOTE — XMS REPORT
Continuity of Care Document (CCD)

 Created on:March 3, 2020



Patient:Stefania Acevedo

Sex:Female

:1950

External Reference #:[id number]





Demographics







 Address  [address] [place] [address]

 

 Mobile Phone  [phone]

 

 Preferred Language  en

 

 Marital Status  Not  or 

 

 Mu-ism Affiliation  Unknown

 

 Race  White

 

 Ethnic Group  Not  or 









Author







 Name  REYES Phillips (transmitted by agent of provider Jaimee Araujo)

 

 Address  [address] [place] [address]









Care Team Providers







 Name  Role  Phone

 

 Gina Arredondo RPA - Medical  Care Team Information   [phone]
[phone]

 

 Kali Loco M.D. - Internal  Care Team Information   [phone]



 Medicine    

 

 Kathy Brady MD -  Care Team Information   [phone]



 Gastroenterology    









Problems







 Active Problems  Provider  Date

 

 Obstructive sleep apnea syndrome  REYES Phillips  Onset: 2019









 Note: Apap









 Coronary atherosclerosis  Gina Arredondo PA  Onset: 2019

 

 Essential hypertension  Gina Arredondo PA  Onset: 2019

 

 Chronic obstructive lung disease  Gina Arredondo PA  Onset: 2019

 

 Hyperlipidemia  Gina Arredondo PA  Onset: 2019

 

 H/O: TIA  Gina Arredondo PA  Onset: 2019

 

 Gastroesophageal reflux disease  Gina Arredondo PA  Onset: 2019

 

 Hyperglycemia  Gina Arredondo PA  Onset: 2019









 Note: noted 









 History of adenomatous polyp of colon  Gina Arredondo PA  Onset: 2019









 Note: high grade  2014









 Gastric hemorrhage  Gina Arredondo PA  Onset: 2019









 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









 Clostridium difficile infection  Gina Arredondo PA  Onset: 2019

 

 Chronic diastolic heart failure  Gina Arredondo PA  Onset: 2019

 

 Diverticulitis of sigmoid colon  Gina Arredondo, PA  Onset: 2019









 Note: 2019









 Polyp of colon  Gina Arredondo, PA  Onset: 2019









 Note: tubular adenomas 2019







Social History







 Type  Date  Description  Comments

 

 Birth Sex    Unknown  

 

 ETOH Use    Consumed 2 glasses of wine  



     per day in the past  

 

 Tobacco Use  Start: Unknown End:  Patient is a former smoker  Quit 2010 pack 
years



   Unknown    40

 

 Smoking Status  Reviewed: 20  Patient is a former smoker  Quit 2010 pack 
years



       40







Allergies, Adverse Reactions, Alerts







 Active Allergies  Reaction  Severity  Comments  Date

 

 Tetanus        2019

 

 Pseudoephedrine        2019

 

 Sudafed        2019

 

 Lipitor        2019

 

 Adhesive        2019







Medications







 Active Medications  SIG  Qnty  Indications  Ordering  Date



         Provider  

 

 Acidophilus  1 by mouth twice a      Noman  2020



 Lactobacillus  day      MD Chidi  



              Capsules          



           

 

 Ciprofloxacin HCL  1 tab by mouth      Kathy Brady  2020



                 500mg  twice a day      MD PATRICE  



 Tablets          



           

 

 Metronidazole  one tablet by      Kathy Brady  2020



             500mg  mouth 3 times      MD PATRICE  



 Tablets  daily for 7 days        



           

 

 Fluticasone  2 sprays to each      Noman  2019



 Propionate  nare every day      MD Chidi  



          50mcg/Act          



 Suspension          



           

 

 Ramipril  Take 1 Capsule By  90caps  I10  Noman  2019



        1.25mg  Mouth Every Day      MD Chidi  



 Capsules          



           

 

 Oxygen Thru [facility]  @ 3 liters via NC,      Noman  2019



 Homecare  continuous  And      MD Chidi  



   with c-pap        

 

 Albuterol Sulfate  1 application  75ml    2019



   every 4 hours as      MD Chidi  



 (2.5mg/3ML) 0.083%  needed        



 Nebulizer          



           

 

 Aspirin Ec Low Dose  1 by mouth every  30tabs    Noman  2019



   day      MD Chidi  



 81mg Tablets           



           

 

 Vitamin C  1 by mouth every      Noman  2019



         1000mg  day      MD Chidi  



 Tablets          



           

 

 Calcium 500 +D  1 by mouth twice a  180tabs    Noman  2019



   day      MD Chidi  



 [id number]          



 Tablets          



           

 

 Pantoprazole Sodium  Take 1 Tablet By  180tabs    Noman  2019



   Mouth Twice Daily      MD Chidi  



 40mg Tablets           



           

 

 Montelukast Sodium  1 by mouth every  90tabs    Noman  2019



                  10mg  day      MD Chidi  



 Tablets          



           

 

 Pravastatin Sodium  Take 1 Tablet By  90tabs    Noman  2019



                  40mg  Mouth Every      MD Chidi  



 Tablets  Evening        



           

 

 Furosemide  1 by mouth every      Noman  2019



          20mg Tablets  day      MD Chidi  



           

 

 Benefiber  daily      Unknown  



          Powder          



           

 

 Probiotic Acidophilus  1 by mouth every      Unknown  



   day -bid        



 Capsules          



           

 

 Proair HFA  1-2 inhalations  8.500gm    Noman  



          108(90Base)  every 4-6 hours as      MD Chidi  



 mcg/Act Aerosol  needed        



           

 

 Incruse Ellipta  inhale 1 puff by  90units    Noman  



   mouth every 24      MD Chidi  



 62.5mcg/Inh Aerosol  hours        



           

 

 Daliresp  1 by mouth every      Kheti, Kali,  



        500mcg Tablets  day      M.D.  



           









 History Medications









 Prednisone  4 tablets by mouth      Unknown  2020 -



           10mg  for 3 days,3        2020



 Tablets  tablets by mouth        



   for 3 days, 2        



   tablets by mouth        



   for 3 days, 1        



   tablet by mouth        



   for 3 days        

 

 Amoxicillin/Clavulan  one pill with food  20tabs  K57.32  Noman  2020 -



 ate Potassium  twice a day      MD Chidi  2020



           



 875-125mg Tablets          



           

 

 Flagyl  1 tab by mouth  15tabs  K57.32  Noman  2020 -



       500mg Tablets  every 8 hours      MD Chidi  2020



           

 

 Oxygen Order  Portable system    R09.02  Noman  10/30/2019 -



   requiring tank      MD Chidi  2020



   delivery 4 liters        



   via nc while        



   ambulatory 3        



   liters via nc        



   while sleeping        

 

 Azithromycin  2 tabs by mouth  11tabs  N83.201  Noman  10/07/2019 -



             250mg  today then 1 tab      MD Chidi  10/20/2019



 Tablets  by mouth daily        



           

 

 Order  Oxygen @ 3L/min,      Noman  2019 -



   continuous      MD Chidi  2020







Immunizations







 Description

 

 No Information Available







Vital Signs







 Date  Vital  Result  Comment

 

 2020 10:56am  Height  62.5 inches  5'2.50"









 Weight  234.25 lb  

 

 Heart Rate  90 /min  

 

 BP Systolic Sitting  148 mmHg  

 

 BP Diastolic Sitting  80 mmHg  

 

 Body Temperature  98.2 F  

 

 O2 % BldC Oximetry  95 %  O2 on 3L

 

 BMI (Body Mass Index)  42.2 kg/m2  









 2020 11:32am  Height  62.5 inches  5'2.50"









 Weight  219.12 lb  

 

 Heart Rate  93 /min  

 

 BP Systolic Sitting  136 mmHg  

 

 BP Diastolic Sitting  80 mmHg  

 

 O2 % BldC Oximetry  97 %  on 3L O2 via nasal cannula

 

 BMI (Body Mass Index)  39.4 kg/m2  







Results







 Test  Acquired Date  Facility  Test  Result  H/L  Range  Note

 

 CBC W/Auto  2020  [facility]  White Blood  9.0 10^3/uL  
Normal  3.5-10.8  



 Diff    [address]  Count        



     [place] [address]          



     [phone]          









 Red Blood Count  4.60 10^6/uL  Normal  3.70-4.87  

 

 Hemoglobin  12.1 g/dL  Normal  12.0-16.0  

 

 Hematocrit  38 %  Normal  35-47  

 

 Mean Corpuscular Volume  83 fL  Normal  80-97  

 

 Mean Corpuscular Hemoglobin  26 pg  Low  27-31  

 

 Mean Corpuscular HGB Conc  32 g/dL  Normal  31-36  

 

 Red Cell Distribution Width  15 %  Normal  10-15  

 

 Platelet Count  285 10^3/uL  Normal  150-450  

 

 Mean Platelet Volume  10.0 fL  Normal  7.4-10.4  

 

 Abs Neutrophils  6.3 10^3/uL  Normal  1.5-7.7  

 

 Abs Lymphocytes  1.6 10^3/uL  Normal  1.0-4.8  

 

 Abs Monocytes  0.9 10^3/uL  High  0-0.8  

 

 Abs Eosinophils  0.1 10^3/uL  Normal  0-0.6  

 

 Abs Basophils  0.1 10^3/uL  Normal  0-0.2  

 

 Abs Nucleated RBC  0.0 10^3/uL      

 

 Granulocyte %  70.2 %      

 

 Lymphocyte %  17.8 %      

 

 Monocyte %  10.0 %      

 

 Eosinophil %  1.4 %      

 

 Basophil %  0.6 %      

 

 Nucleated Red Blood Cells %  0.0      







Procedures







 Date  Code  Description  Status

 

 2019  36984141  Colonoscopy  Completed

 

 2019  [id number]  Mammogram  Completed

 

 2018  [id number]  Mammogram  Completed







[employer] Devices







 Description

 

 No Information Available







Encounters







 Type  Date  Location  Provider  Dx  Diagnosis

 

 Office Visit  2020  [facility]  Gina  J44.0  Chr obstructive



   11:30a    [place]    pulmon disease with



           (acute) lower resp



           infct

 

 Office Visit  2020  [facility]  Gina  K57.32  Dvtrcli of lg int



   2:00p    [place]    w/o perforation or



           abscess w/o



           bleeding

 

 Office Visit  10/30/2019  [facility]  Gina  N83.201  Unspecified 
ovarian



   9:30a    [place]    cyst, right side









 J44.9  Chronic obstructive pulmonary disease, unspecified









 Office Visit  10/07/2019  1:27p  [facility]  Gina  J44.0  Chr 
obstructive



     Walk-in at  [place]    pulmon disease



     [facility]      with (acute) lower



           resp infct









 R09.1  Pleurisy









 Office Visit  2019  9:30a  [facility]  Gina  J44.9  Chronic



     Care  [place]    obstructive



           pulmonary disease,



           unspecified









 N83.201  Unspecified ovarian cyst, right side

 

 K57.32  Dvtrcli of lg int w/o perforation or abscess w/o bleeding







Assessments







 Date  Code  Description  Provider

 

 2020  K57.32  Diverticulitis of large intestine without  [place]ina [place]



     perforation or abscess without bleeding  

 

 2020  J44.0  Chronic obstructive pulmonary disease with  Gina [place]



     (acute) lower respiratory infection  

 

 2020  K57.32  Diverticulitis of large intestine without  Gina [place]



     perforation or abscess without bleeding  

 

 10/30/2019  N83.201  Unspecified ovarian cyst, right side  Gina [place]

 

 10/30/2019  J44.9  Chronic obstructive pulmonary disease,  Gina [place]



     unspecified  

 

 10/07/2019  J44.0  Chronic obstructive pulmonary disease with  Gina [place]



     (acute) lower respiratory infection  

 

 10/07/2019  R09.1  Pleurisy  [place]

 

 2019  J44.9  Chronic obstructive pulmonary disease,  G[place]



     unspecified  

 

 2019  N83.201  Unspecified ovarian cyst, right side  [place]

 

 2019  K57.32  Diverticulitis of large intestine without  [place]ina [place]



     perforation or abs  







Plan of Treatment

Future Appointment(s):2020 10:00 am - REYES Phillips at [facility] - Gina [place], PAK57.32 Diverticulitis of large intestine 
without perforation or abscess without bleedingNew Labs:CBC W/Auto Diff, Ordered
: 20CRP C-Reactive Protein, Ordered: 20Esr Sedimentation Rate, 
Ordered: 20CMP Panel, Ordered: 20Comments:Completing Flagyl and 
Cipro treatmentAllNew Medication:Acidophilus Lactobacillus   - 1 by mouth twice 
a day



Functional Status







 Functional Condition  Comment  Date  Status

 

 Cpap      Active

 

 Nebulizer      Active

 

 Oxygen      Active







Mental Status







 Description

 

 No Information Available







Referrals







 Description

 

 No Information Available

## 2020-03-23 NOTE — XMS REPORT
Continuity of Care Document (CCD)

 Created on:2020



Patient:Stefania Acevedo

Sex:Female

:1950

External Reference #:[id number]





Demographics







 Address  [address] [place] [address]

 

 Home Phone  [phone]

 

 Mobile Phone  [phone]

 

 Work Phone  [phone]

 

 Email Address  [email or web address]

 

 Preferred Language  en

 

 Marital Status  Not  or 

 

 Roman Catholic Affiliation  Unknown

 

 Race  White

 

 Ethnic Group  Not  or 









Author







 Name  Carrillo Sanches PA (transmitted by agent of provider Faye Pedersen)

 

 Address  [address] [facility] [address]



   [place] [address]









Problems







 Active Problems  Provider  Date

 

 Obstructive sleep apnea of adult  Gina Arredondo [facility]  Onset: 2014









 Note: c-pap









 Coronary arteriosclerosis  Eliseo Andrew M.D., [facility]  Onset: 2013

 

 Benign essential hypertension  Eliseo Andrew M.D., [facility]  Onset: 2013

 

 Pure hypercholesterolemia  Eliseo Andrew M.D., [facility]  Onset: 2013

 

 Chronic obstructive lung disease  Eliseo Andrew M.D., [facility]  Onset: 

 

 H/O: TIA  Gina Arredondo [facility]  Onset: 2014

 

 Impaired glucose tolerance  Gina Arredondo [facility]  Onset: 2014









 Note: noted 









 Gastroesophageal reflux disease  Yue Stark, [place]  Onset: 2011

 

 Hyperlipidemia  Eliseo Andrew M.D.,  Onset: 2016



   [facility]  

 

 History of adenomatous polyp of colon  Gina Arredondo [facility]  Onset: 10/25/
2016









 Note: hi grade, tubular 









 Gastric hemorrhage  Gina Arredondo [facility]  Onset: 2018









 Note: AVM, 2018









 Clostridial enteric disease  Gina Arredondo [facility]  Onset: 2018

 

 Chronic diastolic heart failure  Eliseo Andrew M.D.,  Onset: 2018



   [facility]  

 

 Atherosclerotic heart disease of  Lorrie Eliseo RAINEY M.D.,  Onset: 2018



 native coronary artery with  [facility]  



 unspecified angina pectoris    







Social History







 Type  Date  Description  Comments

 

 Birth Sex    Unknown  

 

 Cigarette Use    Pack Years -  40  

 

 Tobacco Use  Start: Unknown End:  Quit  



   Unknown    

 

 Smoking Status  Reviewed: 03/10/20  Quit  

 

 Smokeless Tobacco    Never Used Smokeless Tobacco  

 

 ETOH Use    Consumes 2 glasses of wine  



     per week  

 

 Tobacco Use  Start: Unknown End:  Patient is a former smoker  QUIT 



   Unknown    

 

 Recreational Drug Use    Denies Drug Use  







Allergies, Adverse Reactions, Alerts







 Active Allergies  Reaction  Severity  Comments  Date

 

 Tetanus        2009

 

 Pseudoephedrine        10/04/2016

 

 Tetanus Toxoid Vaccine, Inactivated  Swelling Rash      2020

 

 Sudafed        2009

 

 Lipitor  MYALGIAS      2015

 

 Adhesives  contact dermatitis      2016







Medications







 Active Medications  SIG  Qnty  Indications  Ordering  Date



         Provider  

 

 Ramipril  1 cap by mouth  90caps  R03.0  Jimbo Garcia,  2018



         1.25mg  every day      M.D.  



 Capsules          



           

 

 Fluticasone  Spray 2 Sprays In  48units    Jimbo Garcia,  2018



 Propionate  Each Nostril Once      M.D.  



           50mcg/Act  Daily        



 Suspension          



           

 

 Incruse Ellipta  Inhale 1  30units    Jimbo Garcia,  10/24/2018



   Inhalation Every      M.D.  



 62.5mcg/Inh Aerosol  Day        



           

 

 Duoneb  as needed      Unknown  2018



           



 0.5-2.5(3)mg/3ML          



 Solution          



           

 

 Furosemide  Take 1 Tablet By  60tabs  I50.32  Lynne Harp  2018



           20mg  Mouth Every Day      Edwina, MSN,  



 Tablets  With Second Tablet      FNP  



   as Needed For        



   Edema        

 

 Advair Diskus  take 1 puff twice  3month  J44.9  Kali Loco MD  2018



   daily. rinse mouth        



 500-50mcg/Dose  after use.        



 Aerosol          



           

 

 Probiotic Daily  1 by mouth Twice  60caps    Chase Marquez,  10/21/2016



   Daily      DO  



 Capsules          



           

 

 Nebulizer  Diagnosis: COPD      Kali Loco MD  2016



 Compressor/Dualfilte          



 r/7' Tubing/Aerosol          



 T/Mthpiece          



            Kit          



           

 

 Daliresp  take one tablet by  90tabs    Kali Loco MD  2016



         500mcg  mouth every day        



 Tablets          



           

 

 Proair HFA  inhale two puffs  25.5gm    GarciaJimbo connell,  2015



   by mouth every 4      M.D.  



 108(90Base) mcg/Act  hours as needed        



 Aerosol          



           

 

 Oxygen  oxygen @ 4 liters      Jimbo Garcia,  2015



   at all times      M.D.  



   (except when        



   walking outside        



   may raise to 5        



   liters)        

 

 Pravastatin Sodium  Take One Tablet By  90tabs  E78.0  Jimbo Garcia,  2015



   Mouth Every Day      M.D.  



 40mg Tablets          



           

 

 Montelukast Sodium  Take 1 Tablet By  90taJimbo Vences,  2015



   Mouth Every Day      M.D.  



 10mg Tablets          



           

 

 Calcium 500/D  po daily per pt      Lynne Harp  



         JERI Bland,  



 [id number]        FNP  



 Chewtabs          



           

 

 Pantoprazole Sodium  1 po daily  180tabs    Jimbo Garcia,  



         M.D.  



 40mg Tablets           



           

 

 Vitamin C  1 by mouth every      Unknown  



          1000mg  day        



 Tablets          



           

 

 Aspir-81  1 by mouth every      Unknown  



         81mg Tablets  day        



 DR          

 

 Vitamin B12  1 by mouth once a      Unknown  



            1000mcg  day        



 Tablets ER          



           

 

 Multi Vitamin  1 by mouth every      Unknown  



   day        



 Tablets          



           

 

 Benefiber  1 tablespoon with      Unknown  



           Powder  large glass of        



   water every day        

 

 Prednisone  tapering dose 3      Unknown  



           10mg  days of 3 daily        



 Tablets  then 3 days of 2        



   and then 3 days of        



   1        

 

 Multivitamin Adult  1 by mouth every      Unknown  



   day        



 Chewtabs          



           







Immunizations







 CPT Code  Status  Date  Vaccine  Reaction  Lot #

 

 U-Flu  Given  2018  Influenza,Unspecified    

 

 60397  Given  2018  Influenza High Dose    

 

 99232  Given  2017  Influenza High Dose  received at [facility].  

 

 94684  Given  2017  Influenza High Dose    

 

   Given  10/09/2016  Influenza Vaccine (Fluzone)    



       Age 3 And Older    

 

 06665  Given  10/16/2015  Pneumococcal Conjugate    [id number]



       Vaccine 13 Valent For    



       Intramuscular Use    

 

   Given  10/02/2015  Influenza Vaccine (Fluzone)    [id number]



       Age 3 And Older    

 

 36947  Given  10/17/2014  flu vaccination    

 

 38569  Given  2013  flu vaccination    

 

 58361  Given  2012  flu vaccination    

 

 52084  Given  10/17/2011  flu vaccination    

 

 33525  Given  10/19/2010  flu vaccination    

 

 69252  Given  2010  H1N1 Immuniation    



       Adminstration    

 

 58255  Given  2009  flu vaccination    

 

 58811  Given  10/28/2008  Pneumovax Injection    

 

 55900  Given  10/28/2008  flu vaccination    

 

 31323  Given  Unknown  Influenza Virus Vaccine,    



       Quadrivalent, 36 Mos+, .5ML    







Vital Signs







 Date  Vital  Result  Comment

 

 03/10/2020 11:17am  BP Systolic Sitting Left Arm  147 mmHg  









 BP Diastolic Sitting Left Arm  98 mmHg  

 

 Body Temperature  97.0 F  

 

 Heart Rate  86 /min  

 

 Respiratory Rate  16 /min  

 

 Height  65 inches  5'5"

 

 Weight  215.00 lb  

 

 Pain Level  8  abdominal pain

 

 BMI (Body Mass Index)  35.8 kg/m2  

 

 BSA (Body Surface Area)  2.04 m2  

 

 Ideal body weight in kilograms  57 kg  

 

 O2 % BldC Oximetry  98 %  3 Liters









 2020 12:00pm  BP Systolic  134 mmHg  









 BP Diastolic  80 mmHg  

 

 Body Temperature  98.4 F  

 

 Heart Rate  73 /min  

 

 Respiratory Rate  16 /min  

 

 O2 % BldC Oximetry  98 %  







Results







 Test  Acquired  Facility  Test  Result  H/L  Range  Note



   Date            

 

 Lymphocytes #  2020  N2N/CCD Import  Lymphocytes #  1.28    1.0-4.0  



 Bld Auto      (Auto)        

 

 Monocytes # Bld  2020  N2N/CCD Import  Monocytes # (Auto)  0.55    0.3-
0.9  



 Auto              

 

 Eosinophil #  2020  N2N/CCD Import  Eosinophils #  0.20    0.0-0.5  



 Bld Auto      (Auto)        

 

 Basophils # Bld  2020  N2N/CCD Import  Basophils # (Auto)  0.03    0.0-
0.1  



 Auto              

 

 Imm  2020  N2N/CCD Import  Immature  0.02      



 Granulocytes #      Granulocyte #        



 Bld Auto      (Auto)        

 

 nRBC # Bld Auto  2020  N2N/CCD Import  Nucleated RBC  0.00      



       Absolute Count        



       (auto)        

 

 Glucose  2020  N2N/CCD Import  Glucose Screen  104      



 SerPl-mCnc              

 

 BUN SerPl-mCnc  2020  N2N/CCD Import  Blood Urea  6  Low  7-18  



       Nitrogen        

 

 Creat  2020  N2N/CCD Import  Creatinine  0.7    0.6-1.3  



 SerPl-mCnc              

 

 GFR/Bsa  2020  N2N/CCD Import  Estimated GFR  >60    >60  



 pred.non black      (Non-        



 SerPl      American        



 MDRD-ArVRat              

 

 GFR/Bsa  2020  N2N/CCD Import  Estimated GFR  >60    >60  



 pred.black      ()        



 SerPl              



 MDRD-ArVRat              

 

 BUN/Creat SerPl  2020  N2N/CCD Import  BUN/Creatinine  8.5      



       Ratio        

 

 Sodium  2020  N2N/CCD Import  Sodium Level  137    136-145  



 SerPl-sCnc              

 

 Potassium  2020  N2N/CCD Import  Potassium Level  3.7    3.5-5.1  



 SerPl-sCnc              

 

 Chloride  2020  N2N/CCD Import  Chloride Level  102      



 SerPl-sCnc              

 

 Co2 SerPl-sCnc  2020  N2N/CCD Import  Carbon Dioxide  34  High  21-32  



       Level        

 

 Anion Gap  2020  N2N/CCD Import  Anion Gap  1  Low  8-16  



 SerPl-sCnc              

 

 Calcium  2020  N2N/CCD Import  Calcium Level  9.0    8.5-10.1  



 SerPl-mCnc              

 

 WBC # XXX Auto  2020  N2N/CCD Import  White Blood Count  6.1    3.1-10.7
  

 

 RBC # Bld Auto  2020  N2N/CCD Import  Red Blood Count  3.97    3.90-5.40
  

 

 Hgb Bld-mCnc  2020  N2N/CCD Import  Hemoglobin  10.3  Low  11.6-15.8  

 

 Hct VFr Bld  2020  N2N/CCD Import  Hematocrit  35.1  Low  36.0-46.1  



 Auto              

 

 MCV RBC Auto  2020  N2N/CCD Import  Mean Corpuscular  88.4    80.9-99.0  



       Volume        

 

 MCH RBC Qn Auto  2020  N2N/CCD Import  Mean Corpuscular  25.9    25.9-
32.7  



       Hemoglobin        

 

 MCHC RBC  2020  N2N/CCD Import  Mean Corpuscular  29.3  Low  30.8-34.3  



 Auto-mCnc      Hemoglobin Concent        

 

 Platelet # Bld  2020  N2N/CCD Import  Platelet Count  292    155-360  



 Auto              

 

 RDW RBC Auto  2020  N2N/CCD Import  Red Cell  43.8    36-47  



       Distribution Width        

 

 RDW RBC  2020  N2N/CCD Import  RDW Coefficient of  13.4    11.7-14.4  



 Auto-Rto      Variation        

 

 PMV Bld Auto  2020  N2N/CCD Import  Mean Platelet  11.2    8.9-12.4  



       Volume        

 

 Neutrophils/kimberly  2020  N2N/CCD Import  Neutrophils (%)  65.8    40.4-
72.8  



 k NFr Bld Auto      (Auto)        

 

 Lymphocytes/kimberly  2020  N2N/CCD Import  Lymphocytes (%)  21.1    20.0-
42.0  



 k NFr Bld Auto      (Auto)        

 

 Monocytes/leuk  2020  N2N/CCD Import  Monocytes (%)  9.0    4.3-13.2  



 NFr Bld Auto      (Auto)        

 

 Eosinophil/leuk  2020  N2N/CCD Import  Eosinophils (%)  3.3    0.0-6.6  



 NFr Bld Auto      (Auto)        

 

 Basophils/leuk  2020  N2N/CCD Import  Basophils (%)  0.5    0.0-1.1  



 NFr Bld Auto      (Auto)        

 

 Imm  2020  N2N/CCD Import  Immature  0.3    0.0-5.0  



 Granulocytes/le      Granulocyte %        



 uk NFr Bld Auto      (Auto)        

 

 nRBC/100 WBC  2020  N2N/CCD Import  Nucleated Red  0.0    < 10/ 100  



 Bld Auto-Rto      Blood Cells %      WBC  



       (auto)        

 

 Neutrophils #  2020  N2N/CCD Import  Neutrophils #  4.00    1.8-7.0  



 Bld Auto      (Auto)        

 

 Inr PPP  2020  N2N/CCD Import  Inr International  1.0    0.9-1.1  



       Normalized Ratio        

 

 Prot SerPl-mCnc  2020  N2N/CCD Import  Total Protein  7.4    6.4-8.2  

 

 Albumin  2020  N2N/CCD Import  Albumin  2.9  Low  3.4-5.0  



 SerPl-mCnc              

 

 Globulin Ser  2020  N2N/CCD Import  Globulin  4.5  High  1.9-4.3  



 Calc-mCnc              

 

 Albumin/Glob  2020  N2N/CCD Import  Albumin/Globulin  0.6      



 SerPl      Ratio        

 

 Bilirub  2020  N2N/CCD Import  Total Bilirubin  0.2    0.2-1.0  



 SerPl-mCnc              

 

 Ast SerPl-cCnc  2020  N2N/CCD Import  Aspartate Amino  9  Low  15-37  



       Transf (Ast/Sgot)        

 

 Alt SerPl-cCnc  2020  N2N/CCD Import  Alanine  27    12-78  



       Aminotransferase        



       (Alt/SGPT)        

 

 Alp SerPl-cCnc  2020  N2N/CCD Import  Alkaline  65      



       Phosphatase        

 

 Lipase  2020  N2N/CCD Import  Lipase  44  Low    



 SerPl-cCnc              

 

 Lactate  2020  N2N/CCD Import  Lactic Acid Level  0.8    0.4-1.9  



 SerPl-sCnc              

 

 Troponin I  2020  N2N/CCD Import  Troponin I  < 0.015      



 SerPl-mCnc              

 

 Color Ur Auto  2020  N2N/CCD Import  Urine Color  Light-Yellow    Yellow
  

 

 Appearance Ur  2020  N2N/CCD Import  Urine Clarity  Clear    Clear  

 

 Glucose Ur Ql  2020  N2N/CCD Import  Urine Glucose (Ua)  Negative    
Negative  



 Strip.auto              

 

 Bilirub Ur Ql  2020  N2N/CCD Import  Urine Bilirubin  Negative    
Negative  



 Strip.auto              

 

 Ketones Ur Ql  2020  N2N/CCD Import  Urine Ketones  Negative    Negative
  



 Strip.auto              

 

 Sp Gr Ur  2020  N2N/CCD Import  Urine Specific  1.007  Low  1.010-1.03  



 Refractometry      Gravity      0  

 

 Hgb Ur Ql  2020  N2N/CCD Import  Urine Blood  Negative    Negative  



 Strip.auto              

 

 pH Ur  2020  N2N/CCD Import  Urine pH  5.0  Low  6.5-7.5  



 Strip.auto              

 

 Prot Ur Ql  2020  N2N/CCD Import  Urine Protein  Negative    Negative  



 Strip.auto              

 

 Urobilinogen Ur  2020  N2N/CCD Import  Urine Urobilinogen  < 2.0    < 
2.0  



 Ql Strip.auto              

 

 Nitrite Ur Ql  2020  N2N/CCD Import  Urine Nitrite  Negative    Negative
  



 Strip.auto              

 

 Leukocyte  2020  N2N/CCD Import  Urine Leukocyte  Negative    Negative  



 esterase Ur Ql      Esterase        



 Strip.auto              

 

 Prothrombin  2020  N2N/CCD Import  Prothrombin Time  13.3    12.0-14.4  



 time              

 

 Inhaled O2  2020  N2N/CCD Import  FiO2  21      



 concentration              

 

 Unloinc  2020  N2N/CCD Import  Blood Gas Position  Sitting Up      



         In Bed      

 

 Unloinc  2020  N2N/CCD Import  Blood Gas Patient  Ambulating      



       Status        

 

 Heart rate  2020  N2N/CCD Import  Blood Gas Patient  116      



 PulseOx      Heart Rate        

 

 Unloinc  2020  N2N/CCD Import  Oximetry Flow  4      









 Oxygen Delivery Device  N/C      









 SaO2 % BldA  2020  N2N/CCD Import  Oxygen  90  Low  93-98  



 PulseOx      Saturation        



       (Pulse        



       Oximetry)        

 

 Anisocytosis Bld  2020  N2N/CCD Import  Anisocytosis  0-1+      



 Ql Smear              

 

 Platelet Bld Ql  2020  N2N/CCD Import  Platelet  Normal      



 Smear      Estimate        

 

 Monocytes/leuk  2020  N2N/CCD Import  Monocytes %  9    0-10  



 NFr Bld Manual              

 

 Variant  2020  N2N/CCD Import  Atypical  2    0-7  



 Lymphs/leuk NFr      Lymphocytes %        



 Bld Manual              

 

 Lymphocytes/leuk  2020  N2N/CCD Import  Lymphocytes %  6  Low  20-42  



 NFr Bld Manual              

 

 Neuts Seg/leuk  2020  N2N/CCD Import  Neutrophils %  75  High  33-73  



 NFr Bld Manual              

 

 Neuts Band/leuk  2020  N2N/CCD Import  Band  8    0-8  



 NFr Bld Manual      Neutrophils %        

 

 Total Cells  2020  N2N/CCD Import  Differential  100      



 Counted Bld      Total Cells        



       Counted        

 

 Unloinc  2020  N2N/CCD Import  Manual Slide  Diff Ordered      



       Review        



       (Hematology)        

 

 Bacteria XXX  2020  N2N/CCD Import  Respiratory  Respiratory      



 Resp Cult      Culture  Марина      

 

 Bacteria Bld  2020  N2N/CCD Import  Aerobic Blood  No Growth:      



 Aerobe Cult      Culture  Final Report      

 

 Bacteria Bld  2020  N2N/CCD Import  Anaerobic Blood  No Growth:      



 Anaerobe Cult      Culture  Final Report      

 

 RBC #/area UrnS  2020  N2N/CCD Import  Urine RBC  0-2    0-2  



 HPF              

 

 WBC #/area UrnS  2020  N2N/CCD Import  Urine WBC  0-2    0-7  



 HPF              

 

 Epi Cells UrnS  2020  N2N/CCD Import  Urine  Moderate    None Seen  



 Ql Micro      Epithelial        



       Cells        

 

 Bacteria UrnS Ql  2020  N2N/CCD Import  Urine Bacteria  Few    None Seen
  



 Micro              

 

 L pneumo1 Ag Ur  2020  N2N/CCD Import  Legionella  Negative    Negative  



 Ql Ia      Antigen (Lab)        

 

 Fluav Ag XXX Ql  2020  N2N/CCD Import  Influenza Type  Negative    (
Negative)  



       A Antigen        

 

 Flubv Ag XXX Ql  2020  N2N/CCD Import  Influenza Type  Negative    (
Negative)  



       B Antigen        







Procedures







 Date  Code  Description  Status

 

 2019  09734082  Colonoscopy  Completed

 

 2019  [id number]  Mammogram  Completed

 

 2018  98762500  Mammogram  Completed

 

 2015  13295752  Colonoscopy  Completed

 

 2014  19774385  Colonoscopy  Completed

 

 2010  412937623  Bone Mineral Density Test  Completed

 

 10/26/2004  21244528  Colonoscopy  Completed







[employer] Devices







 Description

 

 No Information Available







Encounters







 Type  Date  Location  Provider  Dx  Diagnosis

 

 Office Visit  03/10/2020  GI  Carrillo Sanches,  R19.7  Diarrhea, unspecified



   11:15a    PA    









 K57.92  Dvtrcli of intest, part unsp, w/o perf or abscess w/o bleed









 Office Visit  2020 10:30a  Pulmonology  Nathan Ayers MD  J44.9  Chronic 
obstructive



           pulmonary disease,



           unspecified









 G47.33  Obstructive sleep apnea (adult) (pediatric)

 

 J96.11  Chronic respiratory failure with hypoxia

 

 R91.1  Solitary pulmonary nodule









 Office Visit  10/23/2019  1:15p  GI  Jose A Faust MD  K57.30  Dvrtclos of 
lg int w/o



           perforation or abscess



           w/o bleeding









 K63.5  Polyp of colon

 

 D50.9  Iron deficiency anemia, unspecified

 

 K29.50  Unspecified chronic gastritis without bleeding







Assessments







 Date  Code  Description  Provider

 

 03/10/2020  R19.7  Diarrhea, unspecified  Carrillo Sanches PA

 

 03/10/2020  K57.92  Diverticulitis of intestine, part  Carrillo Sanches PA



     unspecified, without perforation or abscess  



     without bleeding  

 

 2020  R10.32  Left lower quadrant pain  Salanger, Antonella, NP

 

 2020  J96.11  Chronic respiratory failure with hypoxia  Salanger, Antonella, 
NP

 

 2020  K59.00  Constipation, unspecified  Salanger, Antonella, NP

 

 2020  K57.92  Diverticulitis of intestine, part  Salanger, Antonella, NP



     unspecified, without perforation or abscess  



     without bleeding  

 

 2020  K57.92  Diverticulitis of intestine, part  Ali, Mohammad, MD



     unspecified, without perforation or abscess  



     without bleeding  

 

 2020  R10.32  Left lower quadrant pain  Salanger, Antonella, NP

 

 2020  R93.5  Abnormal findings on diagnostic imaging of  Kathy Brady MD



     other abdominal regions, including  



     retroperitoneum  

 

 2020  K57.90  Diverticulosis of intestine, part  Salanger, Antonella, NP



     unspecified, without perforation or abscess  



     without bleeding  

 

 2020  R10.9  Unspecified abdominal pain  Kathy Brady MD

 

 2020  J96.11  Chronic respiratory failure with hypoxia  Salanger, Antonella, 
NP

 

 2020  Z87.19  Personal history of other diseases of the  Kathy Brady MD



     digestive system  

 

 2020  K59.00  Constipation, unspecified  Salanger, Antonella, NP

 

 2020  R10.32  Left lower quadrant pain  Salanger, Antonella, NP

 

 2020  K57.90  Diverticulosis of intestine, part  Salanger, Antonella, NP



     unspecified, without perforation or abscess  



     without bleeding  

 

 2020  J96.11  Chronic respiratory failure with hypoxia  Salanger, Antonella, 
NP

 

 2020  K59.00  Constipation, unspecified  Salanger, Antonella, NP

 

 2020  J44.9  Chronic obstructive pulmonary disease,  Nathan Ayers MD



     unspecified  

 

 2020  G47.33  Obstructive sleep apnea (adult) (pediatric)  Nathan Ayers MD

 

 2020  J96.11  Chronic respiratory failure with hypoxia  Nathan Ayers MD

 

 2020  R91.1  Solitary pulmonary nodule  Nathan Ayers MD

 

 2020  J44.1  Chronic obstructive pulmonary disease with  Jazmyn Sabillon MD



     (acute) exacerbation  

 

 2020  J96.21  Acute and chronic respiratory failure with  Jazmyn Sabillon MD



     hypoxia  

 

 2020  J06.9  Acute upper respiratory infection,  Jazmyn Sabillon MD



     unspecified  

 

 2020  R00.0  Tachycardia, unspecified  Jazmyn Sabillon MD

 

 2020  J44.1  Chronic obstructive pulmonary disease with  Jazmyn Sabillon MD



     (acute) exacerbation  

 

 2020  J96.21  Acute and chronic respiratory failure with  Jazmyn Sabillon MD



     hypoxia  

 

 2020  J06.9  Acute upper respiratory infection,  Jazmyn Sabillon MD



     unspecified  

 

 2020  R00.0  Tachycardia, unspecified  Jazmyn Sabillon MD

 

 2020  J44.1  Chronic obstructive pulmonary disease with  Jazmyn Sabillon MD



     (acute) exacerbation  

 

 2020  J96.21  Acute and chronic respiratory failure with  Jazmyn Sabillon MD



     hypoxia  

 

 2020  J06.9  Acute upper respiratory infection,  Jazmyn Sabillon MD



     unspecified  

 

 2020  R00.0  Tachycardia, unspecified  Jazmyn Sabillon MD

 

 2020  J44.1  Chronic obstructive pulmonary disease with  Jazmyn Sabillon MD



     (acute) exacerbation  

 

 2020  J96.21  Acute and chronic respiratory failure with  Jazmyn Sabillon MD



     hypoxia  

 

 2020  J06.9  Acute upper respiratory infection,  Jazmyn Sabillon MD



     unspecified  

 

 2020  R00.0  Tachycardia, unspecified  Jamzyn Sabillon MD

 

 10/23/2019  K57.30  Diverticulosis of large intestine without  Jose A Faust MD



     perforation or abscess without bleeding  

 

 10/23/2019  K63.5  Polyp of colon  Jose A Faust MD

 

 10/23/2019  D50.9  Iron deficiency anemia, unspecified  Jose A Faust MD

 

 10/23/2019  K29.50  Unspecified chronic gastritis without  Jose A Faust MD



     bleeding  







Plan of Treatment

Future Appointment(s):2020 11:30 am - Carrillo Sanches PA at GI2020 10:30 am - Nathan Aeyrs MD at [id number]/10/2020 - Carrillo Sanches, 
PAR19.7 Diarrhea, unspecifiedComments:C. difficile culture by PCR is ordered.  
She will contact me Friday for mnngntbB47.92 Diverticulitis of intestine, part 
unspecified, without perforation or abscess without bleedingComments:In 
reviewing with Dr. Brady the patient does need a follow-up colonoscopy.  We will 
schedule this onceher diarrhea improves/resolves



Functional Status







 Functional Condition  Comment  Date  Status

 

 Glasses      Active

 

 Independent with all ADL's      Active

 

 Complete Dentures      Active







Mental Status







 Description

 

 No Information Available







Referrals







 Description

 

 No Information Available

## 2020-03-23 NOTE — XMS REPORT
Continuity of Care Document (CCD)

 Created on:March 10, 2020



Patient:Stefania Acevedo

Sex:Female

:1950

External Reference #:[id number]





Demographics







 Address  [address] [place] [address]

 

 Home Phone  [phone]

 

 Mobile Phone  [phone]

 

 Work Phone  [phone]

 

 Email Address  [email or web address]

 

 Preferred Language  en

 

 Marital Status  Not  or 

 

 Advent Affiliation  Unknown

 

 Race  White

 

 Ethnic Group  Not  or 









Author







 Name  Carrillo Sanches PA (transmitted by agent of provider Yokasta Acevedo)

 

 Address  [address], [address]



   [place] [address]









Problems







 Active Problems  Provider  Date

 

 Obstructive sleep apnea of adult  Gina Arredondo [facility]  Onset: 2014









 Note: c-pap









 Coronary arteriosclerosis  Eliseo Andrew M.D., [facility]  Onset: 2013

 

 Benign essential hypertension  Eliseo Andrew M.D., [facility]  Onset: 2013

 

 Pure hypercholesterolemia  Eliseo Andrew M.D., [facility]  Onset: 2013

 

 Chronic obstructive lung disease  Eliseo Andrew M.D., [facility]  Onset: 

 

 H/O: TIA  Gina Arredondo [facility]  Onset: 2014

 

 Impaired glucose tolerance  Gina Arredondo [facility]  Onset: 2014









 Note: noted 









 Gastroesophageal reflux disease  Yue Stark FNP  Onset: 2011

 

 Hyperlipidemia  Eliseo Andrew M.D.,  Onset: 2016



   [facility]  

 

 History of adenomatous polyp of colon  Gina Arredondo [facility]  Onset: 10/25/
2016









 Note: hi grade, tubular 









 Gastric hemorrhage  Gina Arredondo [facility]  Onset: 2018









 Note: AVM, 2018









 Clostridial enteric disease  Gina Arredondo [facility]  Onset: 2018

 

 Chronic diastolic heart failure  Eliseo Andrew M.D.,  Onset: 2018



   [facility]  

 

 Atherosclerotic heart disease of  LorrieEliseo M.D.,  Onset: 2018



 native coronary artery with  [facility]  



 unspecified angina pectoris    







Social History







 Type  Date  Description  Comments

 

 Birth Sex    Unknown  

 

 Cigarette Use    Pack Years -  40  

 

 Tobacco Use  Start: Unknown End:  Quit  



   Unknown    

 

 Smoking Status  Reviewed: 03/10/20  Quit  

 

 Smokeless Tobacco    Never Used Smokeless Tobacco  

 

 ETOH Use    Consumes 2 glasses of wine  



     per week  

 

 Tobacco Use  Start: Unknown End:  Patient is a former smoker  QUIT 



   Unknown    

 

 Recreational Drug Use    Denies Drug Use  







Allergies, Adverse Reactions, Alerts







 Active Allergies  Reaction  Severity  Comments  Date

 

 Tetanus        2009

 

 Pseudoephedrine        10/04/2016

 

 Tetanus Toxoid Vaccine, Inactivated  Swelling Rash      2020

 

 Sudafed        2009

 

 Lipitor  MYALGIAS      2015

 

 Adhesives  contact dermatitis      2016







Medications







 Active Medications  SIG  Qnty  Indications  Ordering  Date



         Provider  

 

 Ramipril  1 cap by mouth  90caps  R03.0  Tor Garciashan,  2018



         1.25mg  every day      M.D.  



 Capsules          



           

 

 Fluticasone  Spray 2 Sprays In  48units    GarciaiJmbo connell,  2018



 Propionate  Each Nostril Once      M.D.  



           50mcg/Act  Daily        



 Suspension          



           

 

 Incruse Ellipta  Inhale 1  30units    GarciaJimbo connell,  10/24/2018



   Inhalation Every      M.D.  



 62.5mcg/Inh Aerosol  Day        



           

 

 Duoneb  as needed      Unknown  2018



           



 0.5-2.5(3)mg/3ML          



 Solution          



           

 

 Furosemide  Take 1 Tablet By  60tabs  I50.32  Lynne Harp  2018



           20mg  Mouth Every Day      Edwina, MSN,  



 Tablets  With Second Tablet      FNP  



   as Needed For        



   Edema        

 

 Advair Diskus  take 1 puff twice  3month  J44.9  Kali Loco MD  2018



   daily. rinse mouth        



 500-50mcg/Dose  after use.        



 Aerosol          



           

 

 Probiotic Daily  1 by mouth Twice  60caps    Chase Marquez,  10/21/2016



   Daily      DO  



 Capsules          



           

 

 Nebulizer  Diagnosis: COPD      Kali Loco MD  2016



 Compressor/Dualfilte          



 r/7' Tubing/Aerosol          



 T/Mthpiece          



            Kit          



           

 

 Daliresp  take one tablet by  90tabs    Kali Loco MD  2016



         500mcg  mouth every day        



 Tablets          



           

 

 Proair HFA  inhale two puffs  25.5gm    GarciaJimbo connell,  2015



   by mouth every 4      M.D.  



 108(90Base) mcg/Act  hours as needed        



 Aerosol          



           

 

 Oxygen  oxygen @ 4 liters      Jimbo Garcia,  2015



   at all times      M.D.  



   (except when        



   walking outside        



   may raise to 5        



   liters)        

 

 Pravastatin Sodium  Take One Tablet By  90tabs  E78.0  Jimbo Garcia,  2015



   Mouth Every Day      M.D.  



 40mg Tablets          



           

 

 Montelukast Sodium  Take 1 Tablet By  90tabs    Jimbo Garcia,  2015



   Mouth Every Day      M.D.  



 10mg Tablets          



           

 

 Calcium 500/D  po daily per pt      Lynne Harp  



         JERI Bland,  



 [id number]        FNP  



 Chewtabs          



           

 

 Pantoprazole Sodium  1 po daily  180tabs    Jimbo Garcia,  



         M.D.  



 40mg Tablets DR          



           

 

 Vitamin C  1 by mouth every      Unknown  



          1000mg  day        



 Tablets          



           

 

 Aspir-81  1 by mouth every      Unknown  



         81mg Tablets  day        



 DR          

 

 Vitamin B12  1 by mouth once a      Unknown  



            1000mcg  day        



 Tablets ER          



           

 

 Multi Vitamin  1 by mouth every      Unknown  



   day        



 Tablets          



           

 

 Benefiber  1 tablespoon with      Unknown  



           Powder  large glass of        



   water every day        

 

 Prednisone  tapering dose 3      Unknown  



           10mg  days of 3 daily        



 Tablets  then 3 days of 2        



   and then 3 days of        



   1        

 

 Multivitamin Adult  1 by mouth every      Unknown  



   day        



 Chewtabs          



           







Immunizations







 CPT Code  Status  Date  Vaccine  Reaction  Lot #

 

 U-Flu  Given  2018  Influenza,Unspecified    

 

 24887  Given  2018  Influenza High Dose    

 

 07292  Given  2017  Influenza High Dose  received at [facility].  

 

 35775  Given  2017  Influenza High Dose    

 

   Given  10/09/2016  Influenza Vaccine (Fluzone)    



       Age 3 And Older    

 

 51587  Given  10/16/2015  Pneumococcal Conjugate    [id number]



       Vaccine 13 Valent For    



       Intramuscular Use    

 

   Given  10/02/2015  Influenza Vaccine (Fluzone)    [id number]



       Age 3 And Older    

 

 62467  Given  10/17/2014  flu vaccination    

 

 63665  Given  2013  flu vaccination    

 

 29530  Given  2012  flu vaccination    

 

 84728  Given  10/17/2011  flu vaccination    

 

 15020  Given  10/19/2010  flu vaccination    

 

 74465  Given  2010  H1N1 Immuniation    



       Adminstration    

 

 57450  Given  2009  flu vaccination    

 

 78723  Given  10/28/2008  Pneumovax Injection    

 

 35202  Given  10/28/2008  flu vaccination    

 

 99604  Given  Unknown  Influenza Virus Vaccine,    



       Quadrivalent, 36 Mos+, .5ML    







Vital Signs







 Date  Vital  Result  Comment

 

 03/10/2020 11:17am  BP Systolic Sitting Left Arm  147 mmHg  









 BP Diastolic Sitting Left Arm  98 mmHg  

 

 Body Temperature  97.0 F  

 

 Heart Rate  86 /min  

 

 Respiratory Rate  16 /min  

 

 Height  65 inches  5'5"

 

 Weight  215.00 lb  

 

 Pain Level  8  abdominal pain

 

 BMI (Body Mass Index)  35.8 kg/m2  

 

 BSA (Body Surface Area)  2.04 m2  

 

 Ideal body weight in kilograms  57 kg  

 

 O2 % BldC Oximetry  98 %  3 Liters









 2020 12:00pm  BP Systolic  134 mmHg  









 BP Diastolic  80 mmHg  

 

 Body Temperature  98.4 F  

 

 Heart Rate  73 /min  

 

 Respiratory Rate  16 /min  

 

 O2 % BldC Oximetry  98 %  







Results







 Test  Acquired  Facility  Test  Result  H/L  Range  Note



   Date            

 

 Lymphocytes #  2020  N2N/CCD Import  Lymphocytes #  1.28    1.0-4.0  



 Bld Auto      (Auto)        

 

 Monocytes # Bld  2020  N2N/CCD Import  Monocytes # (Auto)  0.55    0.3-
0.9  



 Auto              

 

 Eosinophil #  2020  N2N/CCD Import  Eosinophils #  0.20    0.0-0.5  



 Bld Auto      (Auto)        

 

 Basophils # Bld  2020  N2N/CCD Import  Basophils # (Auto)  0.03    0.0-
0.1  



 Auto              

 

 Imm  2020  N2N/CCD Import  Immature  0.02      



 Granulocytes #      Granulocyte #        



 Bld Auto      (Auto)        

 

 nRBC # Bld Auto  2020  N2N/CCD Import  Nucleated RBC  0.00      



       Absolute Count        



       (auto)        

 

 Glucose  2020  N2N/CCD Import  Glucose Screen  104      



 SerPl-mCnc              

 

 BUN SerPl-mCnc  2020  N2N/CCD Import  Blood Urea  6  Low  7-18  



       Nitrogen        

 

 Creat  2020  N2N/CCD Import  Creatinine  0.7    0.6-1.3  



 SerPl-mCnc              

 

 GFR/Bsa  2020  N2N/CCD Import  Estimated GFR  >60    >60  



 pred.non black      (Non-        



 SerPl      American        



 MDRD-ArVRat              

 

 GFR/Bsa  2020  N2N/CCD Import  Estimated GFR  >60    >60  



 pred.black      ()        



 SerPl              



 MDRD-ArVRat              

 

 BUN/Creat SerPl  2020  N2N/CCD Import  BUN/Creatinine  8.5      



       Ratio        

 

 Sodium  2020  N2N/CCD Import  Sodium Level  137    136-145  



 SerPl-sCnc              

 

 Potassium  2020  N2N/CCD Import  Potassium Level  3.7    3.5-5.1  



 SerPl-sCnc              

 

 Chloride  2020  N2N/CCD Import  Chloride Level  102      



 SerPl-sCnc              

 

 Co2 SerPl-sCnc  2020  N2N/CCD Import  Carbon Dioxide  34  High  21-32  



       Level        

 

 Anion Gap  2020  N2N/CCD Import  Anion Gap  1  Low  8-16  



 SerPl-sCnc              

 

 Calcium  2020  N2N/CCD Import  Calcium Level  9.0    8.5-10.1  



 SerPl-mCnc              

 

 WBC # XXX Auto  2020  N2N/CCD Import  White Blood Count  6.1    3.1-10.7
  

 

 RBC # Bld Auto  2020  N2N/CCD Import  Red Blood Count  3.97    3.90-5.40
  

 

 Hgb Bld-mCnc  2020  N2N/CCD Import  Hemoglobin  10.3  Low  11.6-15.8  

 

 Hct VFr Bld  2020  N2N/CCD Import  Hematocrit  35.1  Low  36.0-46.1  



 Auto              

 

 MCV RBC Auto  2020  N2N/CCD Import  Mean Corpuscular  88.4    80.9-99.0  



       Volume        

 

 MCH RBC Qn Auto  2020  N2N/CCD Import  Mean Corpuscular  25.9    25.9-
32.7  



       Hemoglobin        

 

 MCHC RBC  2020  N2N/CCD Import  Mean Corpuscular  29.3  Low  30.8-34.3  



 Auto-mCnc      Hemoglobin Concent        

 

 Platelet # Bld  2020  N2N/CCD Import  Platelet Count  292    155-360  



 Auto              

 

 RDW RBC Auto  2020  N2N/CCD Import  Red Cell  43.8    36-47  



       Distribution Width        

 

 RDW RBC  2020  N2N/CCD Import  RDW Coefficient of  13.4    11.7-14.4  



 Auto-Rto      Variation        

 

 PMV Bld Auto  2020  N2N/CCD Import  Mean Platelet  11.2    8.9-12.4  



       Volume        

 

 Neutrophils/kimberly  2020  N2N/CCD Import  Neutrophils (%)  65.8    40.4-
72.8  



 k NFr Bld Auto      (Auto)        

 

 Lymphocytes/kimberly  2020  N2N/CCD Import  Lymphocytes (%)  21.1    20.0-
42.0  



 k NFr Bld Auto      (Auto)        

 

 Monocytes/leuk  2020  N2N/CCD Import  Monocytes (%)  9.0    4.3-13.2  



 NFr Bld Auto      (Auto)        

 

 Eosinophil/leuk  2020  N2N/CCD Import  Eosinophils (%)  3.3    0.0-6.6  



 NFr Bld Auto      (Auto)        

 

 Basophils/leuk  2020  N2N/CCD Import  Basophils (%)  0.5    0.0-1.1  



 NFr Bld Auto      (Auto)        

 

 Imm  2020  N2N/CCD Import  Immature  0.3    0.0-5.0  



 Granulocytes/le      Granulocyte %        



 uk NFr Bld Auto      (Auto)        

 

 nRBC/100 WBC  2020  N2N/CCD Import  Nucleated Red  0.0    < 10/ 100  



 Bld Auto-Rto      Blood Cells %      WBC  



       (auto)        

 

 Neutrophils #  2020  N2N/CCD Import  Neutrophils #  4.00    1.8-7.0  



 Bld Auto      (Auto)        

 

 Inr PPP  2020  N2N/CCD Import  Inr International  1.0    0.9-1.1  



       Normalized Ratio        

 

 Prot SerPl-mCnc  2020  N2N/CCD Import  Total Protein  7.4    6.4-8.2  

 

 Albumin  2020  N2N/CCD Import  Albumin  2.9  Low  3.4-5.0  



 SerPl-mCnc              

 

 Globulin Ser  2020  N2N/CCD Import  Globulin  4.5  High  1.9-4.3  



 Calc-mCnc              

 

 Albumin/Glob  2020  N2N/CCD Import  Albumin/Globulin  0.6      



 SerPl      Ratio        

 

 Bilirub  2020  N2N/CCD Import  Total Bilirubin  0.2    0.2-1.0  



 SerPl-mCnc              

 

 Ast SerPl-cCnc  2020  N2N/CCD Import  Aspartate Amino  9  Low  15-37  



       Transf (Ast/Sgot)        

 

 Alt SerPl-cCnc  2020  N2N/CCD Import  Alanine  27    12-78  



       Aminotransferase        



       (Alt/SGPT)        

 

 Alp SerPl-cCnc  2020  N2N/CCD Import  Alkaline  65      



       Phosphatase        

 

 Lipase  2020  N2N/CCD Import  Lipase  44  Low    



 SerPl-cCnc              

 

 Lactate  2020  N2N/CCD Import  Lactic Acid Level  0.8    0.4-1.9  



 SerPl-sCnc              

 

 Troponin I  2020  N2N/CCD Import  Troponin I  < 0.015      



 SerPl-mCnc              

 

 Color Ur Auto  2020  N2N/CCD Import  Urine Color  Light-Yellow    Yellow
  

 

 Appearance Ur  2020  N2N/CCD Import  Urine Clarity  Clear    Clear  

 

 Glucose Ur Ql  2020  N2N/CCD Import  Urine Glucose (Ua)  Negative    
Negative  



 Strip.auto              

 

 Bilirub Ur Ql  2020  N2N/CCD Import  Urine Bilirubin  Negative    
Negative  



 Strip.auto              

 

 Ketones Ur Ql  2020  N2N/CCD Import  Urine Ketones  Negative    Negative
  



 Strip.auto              

 

 Sp Gr Ur  2020  N2N/CCD Import  Urine Specific  1.007  Low  1.010-1.03  



 Refractometry      Gravity      0  

 

 Hgb Ur Ql  2020  N2N/CCD Import  Urine Blood  Negative    Negative  



 Strip.auto              

 

 pH Ur  2020  N2N/CCD Import  Urine pH  5.0  Low  6.5-7.5  



 Strip.auto              

 

 Prot Ur Ql  2020  N2N/CCD Import  Urine Protein  Negative    Negative  



 Strip.auto              

 

 Urobilinogen Ur  2020  N2N/CCD Import  Urine Urobilinogen  < 2.0    < 
2.0  



 Ql Strip.auto              

 

 Nitrite Ur Ql  2020  N2N/CCD Import  Urine Nitrite  Negative    Negative
  



 Strip.auto              

 

 Leukocyte  2020  N2N/CCD Import  Urine Leukocyte  Negative    Negative  



 esterase Ur Ql      Esterase        



 Strip.auto              

 

 Prothrombin  2020  N2N/CCD Import  Prothrombin Time  13.3    12.0-14.4  



 time              

 

 Inhaled O2  2020  N2N/CCD Import  FiO2  21      



 concentration              

 

 Unloinc  2020  N2N/CCD Import  Blood Gas Position  Sitting Up      



         In Bed      

 

 Unloinc  2020  N2N/CCD Import  Blood Gas Patient  Ambulating      



       Status        

 

 Heart rate  2020  N2N/CCD Import  Blood Gas Patient  116      



 PulseOx      Heart Rate        

 

 Unloinc  2020  N2N/CCD Import  Oximetry Flow  4      









 Oxygen Delivery Device  N/C      









 SaO2 % BldA  2020  N2N/CCD Import  Oxygen  90  Low  93-98  



 PulseOx      Saturation        



       (Pulse        



       Oximetry)        

 

 Anisocytosis Bld  2020  N2N/CCD Import  Anisocytosis  0-1+      



 Ql Smear              

 

 Platelet Bld Ql  2020  N2N/CCD Import  Platelet  Normal      



 Smear      Estimate        

 

 Monocytes/leuk  2020  N2N/CCD Import  Monocytes %  9    0-10  



 NFr Bld Manual              

 

 Variant  2020  N2N/CCD Import  Atypical  2    0-7  



 Lymphs/leuk NFr      Lymphocytes %        



 Bld Manual              

 

 Lymphocytes/leuk  2020  N2N/CCD Import  Lymphocytes %  6  Low  20-42  



 NFr Bld Manual              

 

 Neuts Seg/leuk  2020  N2N/CCD Import  Neutrophils %  75  High  33-73  



 NFr Bld Manual              

 

 Neuts Band/leuk  2020  N2N/CCD Import  Band  8    0-8  



 NFr Bld Manual      Neutrophils %        

 

 Total Cells  2020  N2N/CCD Import  Differential  100      



 Counted Bld      Total Cells        



       Counted        

 

 Unloinc  2020  N2N/CCD Import  Manual Slide  Diff Ordered      



       Review        



       (Hematology)        

 

 Bacteria XXX  2020  N2N/CCD Import  Respiratory  Respiratory      



 Resp Cult      Culture  Маирна      

 

 Bacteria Bld  2020  N2N/CCD Import  Aerobic Blood  No Growth:      



 Aerobe Cult      Culture  Final Report      

 

 Bacteria Bld  2020  N2N/CCD Import  Anaerobic Blood  No Growth:      



 Anaerobe Cult      Culture  Final Report      

 

 RBC #/area UrnS  2020  N2N/CCD Import  Urine RBC  0-2    0-2  



 HPF              

 

 WBC #/area UrnS  2020  N2N/CCD Import  Urine WBC  0-2    0-7  



 HPF              

 

 Epi Cells UrnS  2020  N2N/CCD Import  Urine  Moderate    None Seen  



 Ql Micro      Epithelial        



       Cells        

 

 Bacteria UrnS Ql  2020  N2N/CCD Import  Urine Bacteria  Few    None Seen
  



 Micro              

 

 L pneumo1 Ag Ur  2020  N2N/CCD Import  Legionella  Negative    Negative  



 Ql Ia      Antigen (Lab)        

 

 Fluav Ag XXX Ql  2020  N2N/CCD Import  Influenza Type  Negative    (
Negative)  



       A Antigen        

 

 Flubv Ag XXX Ql  2020  N2N/CCD Import  Influenza Type  Negative    (
Negative)  



       B Antigen        







Procedures







 Date  Code  Description  Status

 

 2019  03449073  Colonoscopy  Completed

 

 2019  41362776  Mammogram  Completed

 

 2018  30619854  Mammogram  Completed

 

 2015  62289360  Colonoscopy  Completed

 

 2014  08456472  Colonoscopy  Completed

 

 2010  621301455  Bone Mineral Density Test  Completed

 

 10/26/2004  84306458  Colonoscopy  Completed







Medical Devices







 Description

 

 No Information Available







Encounters







 Type  Date  Location  Provider  Dx  Diagnosis

 

 Office Visit  2020  Pulmonology  Nathan Ayers MD  J44.9  Chronic 
obstructive



   10:30a        pulmonary disease,



           unspecified









 G47.33  Obstructive sleep apnea (adult) (pediatric)

 

 J96.11  Chronic respiratory failure with hypoxia

 

 R91.1  Solitary pulmonary nodule









 Office Visit  10/23/2019  1:15p  GI  Jose A Faust MD  K57.30  Dvrtclos of 
lg int w/o



           perforation or abscess



           w/o bleeding









 K63.5  Polyp of colon

 

 D50.9  Iron deficiency anemia, unspecified

 

 K29.50  Unspecified chronic gastritis without bleeding







Assessments







 Date  Code  Description  Provider

 

 2020  R10.32  Left lower quadrant pain  Salanger, Antonella, NP

 

 2020  J96.11  Chronic respiratory failure with hypoxia  Salanger, Antonella, 
NP

 

 2020  K59.00  Constipation, unspecified  Salanger, Antonella, NP

 

 2020  K57.92  Diverticulitis of intestine, part unspecified,  Salanger, 
Antonella, NP



     without perforation or abscess without bleeding  

 

 2020  K57.92  Diverticulitis of intestine, part unspecified,  Kathy Brady MD



     without perforation or abscess without bleeding  

 

 2020  R10.32  Left lower quadrant pain  Salanger, Antonella, NP

 

 2020  R93.5  Abnormal findings on diagnostic imaging of  Kathy Brady MD



     other abdominal regions, including  



     retroperitoneum  

 

 2020  K57.90  Diverticulosis of intestine, part unspecified,  Salanger, 
Antonella, NP



     without perforation or abscess without bleeding  

 

 2020  R10.9  Unspecified abdominal pain  Kathy Brady MD

 

 2020  J96.11  Chronic respiratory failure with hypoxia  Salanger, Antonella, 
NP

 

 2020  Z87.19  Personal history of other diseases of the  Kathy Brady MD



     digestive system  

 

 2020  K59.00  Constipation, unspecified  Salanger, Antonella, NP

 

 2020  R10.32  Left lower quadrant pain  Salanger, Antonella, NP

 

 2020  K57.90  Diverticulosis of intestine, part unspecified,  Salanger, 
Antonella, NP



     without perforation or abscess without bleeding  

 

 2020  J96.11  Chronic respiratory failure with hypoxia  Salanger, Antonella, 
NP

 

 2020  K59.00  Constipation, unspecified  Salanger, Antonella, NP

 

 2020  J44.9  Chronic obstructive pulmonary disease,  Nathan Ayers MD



     unspecified  

 

 2020  G47.33  Obstructive sleep apnea (adult) (pediatric)  Nathan Ayers MD

 

 2020  J96.11  Chronic respiratory failure with hypoxia  Nathan Ayers MD

 

 2020  R91.1  Solitary pulmonary nodule  Nathan Ayers MD

 

 2020  J44.1  Chronic obstructive pulmonary disease with  Jazmyn Sabillon MD



     (acute) exacerbation  

 

 2020  J96.21  Acute and chronic respiratory failure with  Jazmyn Sabillon MD



     hypoxia  

 

 2020  J06.9  Acute upper respiratory infection, unspecified  Jazmyn Sabillon MD

 

 2020  R00.0  Tachycardia, unspecified  Jazmyn Sabillon MD

 

 2020  J44.1  Chronic obstructive pulmonary disease with  Jazmyn Sabillon MD



     (acute) exacerbation  

 

 2020  J96.21  Acute and chronic respiratory failure with  Jazmyn Sabillon MD



     hypoxia  

 

 2020  J06.9  Acute upper respiratory infection, unspecified  Jazmyn Sabillon MD

 

 2020  R00.0  Tachycardia, unspecified  Jazmyn Sabillon MD

 

 2020  J44.1  Chronic obstructive pulmonary disease with  Jazmyn Sabillon MD



     (acute) exacerbation  

 

 2020  J96.21  Acute and chronic respiratory failure with  Jazmyn Sabillon MD



     hypoxia  

 

 2020  J06.9  Acute upper respiratory infection, unspecified  Jazmyn Sabillon MD

 

 2020  R00.0  Tachycardia, unspecified  Jazmyn Sabillon MD

 

 2020  J44.1  Chronic obstructive pulmonary disease with  Jazmyn Sabillon MD



     (acute) exacerbation  

 

 2020  J96.21  Acute and chronic respiratory failure with  Jazmyn Sabillon MD



     hypoxia  

 

 2020  J06.9  Acute upper respiratory infection, unspecified  Jazmyn Sabillon MD

 

 2020  R00.0  Tachycardia, unspecified  Jazmyn Sabillon MD

 

 10/23/2019  K57.30  Diverticulosis of large intestine without  Jose A Faust MD



     perforation or abscess without bleeding  

 

 10/23/2019  K63.5  Polyp of colon  Jose A Faust MD

 

 10/23/2019  D50.9  Iron deficiency anemia, unspecified  Jose A Faust MD

 

 10/23/2019  K29.50  Unspecified chronic gastritis without bleeding  Jose A Faust MD







Plan of Treatment

Future Appointment(s):2020 10:30 am - Nathan Ayers MD at [facility]
/2020 - Nathan Ayers MDJ44.9 Chronic obstructive pulmonary disease, 
gqyrmhcngitD64.33 Obstructive sleep apnea (adult) (pediatric)J96.11 Chronic 
respiratory failure with gblypurO85.1 Solitary pulmonary noduleNew Xrays:CT, 
Chest Low Dose For Lung Cancer Screening W/ Out Contrast, Ordered: 20



Functional Status







 Functional Condition  Comment  Date  Status

 

 Glasses      Active

 

 Independent with all ADL's      Active

 

 Complete Dentures      Active







Mental Status







 Description

 

 No Information Available







Referrals







 Description

 

 No Information Available

## 2020-03-23 NOTE — UC
General HPI





- HPI Summary


HPI Summary: 


3 days 


Initially sore throat 


yesterday problem breathing 


Today still with shortness of breath 


No chest pain 


Asthma, COPD - using Advair, rescue and nebulizer - Nebulizer 3 times a day 


Is on oxygen 3L and 4L with exertion 


Hospitalized one month ago for SOB 





No sick contacts 


No travel history 





 drove - he is in the truck 





- History of Current Complaint


Chief Complaint: UCGeneralIllness


Stated Complaint: LOW GRADE FEVER HX EMPHESEMA/COPD


Time Seen by Provider: 20 09:50


Hx Last Menstrual Period: n/a


Pain Intensity: 3





- Allergy/Home Medications


Allergies/Adverse Reactions: 


 Allergies











Allergy/AdvReac Type Severity Reaction Status Date / Time


 


pseudoephedrine Allergy  Tachycardia Verified 20 09:50





[From Sudafed]     


 


Tetanus Vaccines and Toxoid Allergy  Rash Verified 20 09:50











Home Medications: 


 Home Medications





Albuterol 2.5MG/3ML (0.083%)* [Ventolin 2.5 MG/3 ML NEB.SOL*] 2.5 mg INH Q6H 

PRN 01/10/16 [History Confirmed 20]


Albuterol HFA INHALER* [Ventolin HFA Inhaler*] 1 - 2 puff INH Q4H PRN 01/10/16 [

History Confirmed 20]


Fluticasone NASAL SPRAY 50MCG* [Flonase NASAL SPRAY 50MCG*] 2 spray BOTH NARES 

DAILY 01/10/16 [History Confirmed 20]


Montelukast Sodium TAB* [Singulair 10 MG TAB*] 10 mg PO DAILY 01/10/16 [History 

Confirmed 20]


Pantoprazole TAB * [Protonix TAB*] 40 mg PO DAILY 01/10/16 [History Confirmed ]


Pravastatin Sodium [Pravachol] 40 mg PO DAILY 01/10/16 [History Confirmed ]


Aspirin EC TAB* [Ecotrin EC Low Dose 81 MG*] 81 mg PO DAILY 10/04/16 [History 

Confirmed 20]


Roflumilast [Daliresp] 500 mcg PO DAILY 10/04/16 [History Confirmed 20]


Furosemide TAB* [Lasix TAB*] 20 mg PO DAILY 17 [History Confirmed 20

]


Multivitamins/Minerals TAB* [Thera M Plus TAB*] 1 tab PO DAILY 17 [

History Confirmed 20]


Calcium Carbonate [Calcium] 500 mg PO DAILY 19 [History Confirmed 20

]


Fluticasone-Salmeterol 500-50* [Advair Diskus 500-50*] 1 puff INH BID 19 [

History Confirmed 20]


Umeclidinium Bromide [Incruse Ellipta] 62.5 mcg IH DAILY 19 [History 

Confirmed 20]


ramipriL [Ramipril] 1.25 mg PO DAILY 19 [History Confirmed 20]











PMH/Surg Hx/FS Hx/Imm Hx


Previously Healthy: No


Respiratory History: COPD, Asthma, Other - on chronic oxygen





- Surgical History


Surgical History: Yes


Surgery Procedure, Year, and Place: Tonsilectomy, knee surgery, tubal, 

hysterectomy, ehsan





- Family History


Known Family History: Positive: Cardiac Disease, Hypertension, Diabetes





- Social History


Alcohol Use: Weekly


Substance Use Type: None


Smoking Status (MU): Former Smoker


Type: Cigarettes


Amount Used/How Often: 1 PPD


Length of Time of Smoking/Using Tobacco: 46 Years


Have You Smoked in the Last Year: No


When Did the Patient Quit Smoking/Using Tobacco: 4 YRS





- Immunization History


Most Recent Influenza Vaccination: 2015


Most Recent Pneumonia Vaccination: 2015





Review of Systems


All Other Systems Reviewed And Are Negative: Yes


Constitutional: Positive: Fever


Respiratory: Positive: Shortness Of Breath





Physical Exam


Triage Information Reviewed: Yes


Appearance: Other: - respiratory distress


Vital Signs: 


 Initial Vital Signs











Temp  100.2 F   20 09:44


 


Pulse  117   20 09:44


 


Resp  20   20 09:44


 


BP  157/87   20 09:44


 


Pulse Ox  98   20 09:44











Vital Signs Reviewed: Yes


Eyes: Positive: Conjunctiva Clear


Respiratory: Positive: Other: - tachypnea with accessory muscle use  Poor 

areation.  No wheezing





Course/Dx





- Course


Course Of Treatment: 





Patient tachypneic and concern for respiratory distress - took her nebulizer 

about 2 hours ago, helped her somewhat but still in distress


Flu and RSV both negative 





Discussed she needs to go to the ER for further evaluation 


Declined EMS transfer due to insurance and cost 





COVID test sent  





Patient escorted out to her car, where her  is via wheelchair 





Sign out given to Dr. Ferrer at [facility] - he requested patient call before 

entering so that can set up appropriate precautions in place 





- Diagnoses


Provider Diagnosis: 


 Respiratory distress








Discharge ED





- Sign-Out/Discharge


Documenting (check all that apply): Patient Departure


All imaging exams completed and their final reports reviewed: No Studies





- Discharge Plan


Condition: Critical


Disposition: HOME-RECOMMEND TO ED


Forms:  COVID-19 Tested & Isolation


Referrals: 


Gina Arredondo PA [Primary Care Provider] - 


Additional Instructions: 


Recommend go directly to the ER for further evaluation


Call [facility] when you arrive prior to entering the [phone]


We will contact you when the results of Coronavirus has returned 





- Billing Disposition and Condition


Condition: CRITICAL


Disposition: Home-Recommend to ED

## 2020-03-23 NOTE — XMS REPORT
Continuity of Care Document (CCD)

 Created on:2020



Patient:Stefania Acevedo

Sex:Female

:1950

External Reference #:[id number]





Demographics







 Address  [address] [place] [address]

 

 Mobile Phone  [phone]

 

 Preferred Language  en

 

 Marital Status  Not  or 

 

 Pentecostalism Affiliation  Unknown

 

 Race  White

 

 Ethnic Group  Not  or 









Author







 Name  REYES Phillips (transmitted by agent of provider Arik Hurley)

 

 Address  [address] [place] [address]









Care Team Providers







 Name  Role  Phone

 

 Gina Arredondo RPA - Medical  Care Team Information   [phone]
[phone]

 

 Kali Loco M.D. - Internal  Care Team Information   [phone]



 Medicine    









Problems







 Active Problems  Provider  Date

 

 Obstructive sleep apnea syndrome  REYES Phillips  Onset: 2019









 Note: Apap









 Coronary atherosclerosis  Gina Arredondo PA  Onset: 2019

 

 Essential hypertension  Gina Arredondo PA  Onset: 2019

 

 Chronic obstructive lung disease  Gina Arredondo PA  Onset: 2019

 

 Hyperlipidemia  Gina Arredondo PA  Onset: 2019

 

 H/O: TIA  Gina Arredondo PA  Onset: 2019

 

 Gastroesophageal reflux disease  Gina Arredondo PA  Onset: 2019

 

 Hyperglycemia  Gina Arredondo PA  Onset: 2019









 Note: noted 









 History of adenomatous polyp of colon  Gina Arredondo PA  Onset: 2019









 Note: high grade  2014









 Gastric hemorrhage  Gina Arredondo PA  Onset: 2019









 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









 Clostridium difficile infection  Gina Arredondo PA  Onset: 2019

 

 Chronic diastolic heart failure  Gina Arredondo PA  Onset: 2019

 

 Diverticulitis of sigmoid colon  Gina Arredondo PA  Onset: 2019









 Note: 2019









 Polyp of colon  Gina Arredondo, PA  Onset: 2019









 Note: tubular adenomas 2019







Social History







 Type  Date  Description  Comments

 

 Birth Sex    Unknown  

 

 ETOH Use    Consumed 2 glasses of wine  



     per day in the past  

 

 Tobacco Use  Start: Unknown End:  Patient is a former smoker  Quit  pack 
years



   Unknown    40

 

 Smoking Status  Reviewed: 20  Patient is a former smoker  Quit 2010 pack 
years



       40







Allergies, Adverse Reactions, Alerts







 Active Allergies  Reaction  Severity  Comments  Date

 

 Tetanus        2019

 

 Pseudoephedrine        2019

 

 Sudafed        2019

 

 Lipitor        2019

 

 Adhesive        2019







Medications







 Active Medications  SIG  Qnty  Indications  Ordering  Date



         Provider  

 

 Flagyl  1 tab by mouth  15tabs  K57.32  [place]  2020



       500mg Tablets  every 8 hours      MD Chidi  



           

 

 Fluticasone  2 sprays to each      [place]  2019



 Propionate  nare every day      MD Chidi  



           50mcg/Act          



 Suspension          



           

 

 Ramipril  Take 1 Capsule By  90caps  I10  [place]  2019



         1.25mg  Mouth Every Day      MD Chidi  



 Capsules          



           

 

 Oxygen Thru [facility]  @ 3 liters via NC,      [place]  2019



 Homecare  continuous  And      MD Chidi  



   with c-pap        

 

 Albuterol Sulfate  1 application every  75ml    [place]  2019



   4 hours as needed      MD Chidi  



 (2.5mg/3ML) 0.083%          



 Nebulizer          



           

 

 Aspirin Ec Low Dose  1 by mouth every  30tabs    [place]  2019



   day      MD Chidi  



 81mg Tablets           



           

 

 Vitamin C  1 by mouth every      [place]  2019



          1000mg  day      MD Chidi  



 Tablets          



           

 

 Calcium 500 +D  1 by mouth twice a  180tabs    [place]  2019



   day      MD Chidi  



 [id number]          



 Tablets          



           

 

 Pantoprazole Sodium  Take 1 Tablet By  180tabs    [place]  2019



   Mouth Twice Daily      MD Chidi  



 40mg Tablets           



           

 

 Montelukast Sodium  1 by mouth every  90tabs    [place]  2019



   day      MD Chidi  



 10mg Tablets          



           

 

 Pravastatin Sodium  Take 1 Tablet By  90tabs    [place]  2019



   Mouth Every Evening      MD Chidi  



 40mg Tablets          



           

 

 Furosemide  1 by mouth every      [place]  2019



           20mg  day      MD Chidi  



 Tablets          



           

 

 Daliresp  1 by mouth every      [place],  



         500mcg  day      M.D.  



 Tablets          



           

 

 Incruse Ellipta  inhale 1 puff by  90units    [place]  



   mouth every 24      MD Chidi  



 62.5mcg/Inh Aerosol  hours        



           

 

 Proair HFA  1-2 inhalations  8.500gm    [place]  



   every 4-6 hours as      MD Chidi  



 108(90Base) mcg/Act  needed        



 Aerosol          



           

 

 Prednisone  4 tablets by mouth      Unknown  



           10mg  for 4 days,3        



 Tablets  tablets by mouth        



   for 4 days, 2        



   tablets by mouth        



   for 4 days, 1        



   tablet by mouth for        



   4 days        









 History Medications









 Amoxicillin/Clavulanate  one pill with  20tabs  K57.32  [place]  2020 -



 Potassium  food twice a      MD Chidi  2020



   875-125mg Tablets  day        



           

 

 Oxygen Order  Portable    R09.02  [facility]  10/30/2019 -



   system      MD Chidi  2020



   requiring tank        



   delivery 4        



   liters via nc        



   while        



   ambulatory 3        



   liters via nc        



   while sleeping        

 

 Azithromycin  2 tabs by  11tabs  N83.201  [place]  10/07/2019 -



      250mg Tablets  mouth today      MD Chidi  10/20/2019



   then 1 tab by        



   mouth daily        

 

 Order  Oxygen @      [place]  2019 -



   3L/min,      MD Chidi  2020



   continuous        







Immunizations







 Description

 

 No Information Available







Vital Signs







 Date  Vital  Result  Comment

 

 2020 11:32am  Height  62.5 inches  5'2.50"









 Weight  219.12 lb  

 

 Heart Rate  93 /min  

 

 BP Systolic Sitting  136 mmHg  

 

 BP Diastolic Sitting  80 mmHg  

 

 O2 % BldC Oximetry  97 %  on 3L O2 via nasal cannula

 

 BMI (Body Mass Index)  39.4 kg/m2  









 2020  2:02pm  Height  62.5 inches  5'2.50"









 Weight  227.12 lb  

 

 Heart Rate  96 /min  

 

 BP Systolic Sitting  136 mmHg  

 

 BP Diastolic Sitting  72 mmHg  

 

 Body Temperature  98.5 F  

 

 O2 % BldC Oximetry  97 %  

 

 BMI (Body Mass Index)  40.9 kg/m2  







Results







 Test  Acquired Date  Facility  Test  Result  H/L  Range  Note

 

 CBC W/Auto  2020  [facility]  White Blood  9.0 10^3/uL  
Normal  3.5-10.8  



 Diff    [address]  Count        



     [place] [address] [phone]          









 Red Blood Count  4.60 10^6/uL  Normal  3.70-4.87  

 

 Hemoglobin  12.1 g/dL  Normal  12.0-16.0  

 

 Hematocrit  38 %  Normal  35-47  

 

 Mean Corpuscular Volume  83 fL  Normal  80-97  

 

 Mean Corpuscular Hemoglobin  26 pg  Low  27-31  

 

 Mean Corpuscular HGB Conc  32 g/dL  Normal  31-36  

 

 Red Cell Distribution Width  15 %  Normal  10-15  

 

 Platelet Count  285 10^3/uL  Normal  150-450  

 

 Mean Platelet Volume  10.0 fL  Normal  7.4-10.4  

 

 Abs Neutrophils  6.3 10^3/uL  Normal  1.5-7.7  

 

 Abs Lymphocytes  1.6 10^3/uL  Normal  1.0-4.8  

 

 Abs Monocytes  0.9 10^3/uL  High  0-0.8  

 

 Abs Eosinophils  0.1 10^3/uL  Normal  0-0.6  

 

 Abs Basophils  0.1 10^3/uL  Normal  0-0.2  

 

 Abs Nucleated RBC  0.0 10^3/uL      

 

 Granulocyte %  70.2 %      

 

 Lymphocyte %  17.8 %      

 

 Monocyte %  10.0 %      

 

 Eosinophil %  1.4 %      

 

 Basophil %  0.6 %      

 

 Nucleated Red Blood Cells %  0.0      







Procedures







 Date  Code  Description  Status

 

 2019  24240938  Colonoscopy  Completed

 

 2019  69670414  Mammogram  Completed

 

 2018  43406845  Mammogram  Completed







[employer] Devices







 Description

 

 No Information Available







Encounters







 Type  Date  Location  Provider  Dx  Diagnosis

 

 Office Visit  2020  [facility]  [place]  K57.32  Dvtrcli of lg int



   2:00p    [place]    w/o perforation or



           abscess w/o



           bleeding

 

 Office Visit  10/30/2019  [facility]  [place]  N83.201  Unspecified 
ovarian



   9:30a    [place]    cyst, right side









 J44.9  Chronic obstructive pulmonary disease, unspecified









 Office Visit  10/07/2019  1:27p  [facility]  J44.0  Chr 
obstructive



     Walk-in at  [place]    pulmon disease



     [facility]      with (acute) lower



           resp infct









 R09.1  Pleurisy









 Office Visit  2019  9:30a  [facility]  [place]  J44.9  Chronic



     Care  [place]    obstructive



           pulmonary disease,



           unspecified









 N83.201  Unspecified ovarian cyst, right side

 

 K57.32  Dvtrcli of lg int w/o perforation or abscess w/o bleeding







Assessments







 Date  Code  Description  Provider

 

 2020  J44.0  Chronic obstructive pulmonary disease with  Gina Arnulfo PA



     (acute) lower respiratory infection  

 

 2020  K57.32  Diverticulitis of large intestine without  Gina [place]
, [place]



     perforation or abscess without bleeding  

 

 10/30/2019  N83.201  Unspecified ovarian cyst, right side  Gina [place], [place]

 

 10/30/2019  J44.9  Chronic obstructive pulmonary disease,  Gina [place], [place]



     unspecified  

 

 10/07/2019  J44.0  Chronic obstructive pulmonary disease with  Gina [place]
, PA



     (acute) lower respiratory infection  

 

 10/07/2019  R09.1  Pleurisy  Gina [place], [place]

 

 2019  J44.9  Chronic obstructive pulmonary disease,  Gina [place], [place]



     unspecified  

 

 2019  N83.201  Unspecified ovarian cyst, right side  Gina [place], [place]

 

 2019  K57.32  Diverticulitis of large intestine without  Gina [place]
, PA



     perforation or abs  







Plan of Treatment

Future Appointment(s):2020 10:00 am - REYES Phillips at [facility] 
Care2020 - REYES PhillipsJ44.0 Chronic obstructive pulmonary 
disease with (acute) lower respiratory infection



Functional Status







 Functional Condition  Comment  Date  Status

 

 Cpap      Active

 

 Nebulizer      Active

 

 Oxygen      Active







Mental Status







 Description

 

 No Information Available







Referrals







 Description

 

 No Information Available

## 2020-03-23 NOTE — XMS REPORT
Continuity of Care Document (CCD)

 Created on:2020



Patient:Stefania Acevedo

Sex:Female

:1950

External Reference #:[id number]





Demographics







 Address  [address]



   [place] [address]

 

 Home Phone  [phone]

 

 Mobile Phone  [phone]

 

 Work Phone  [phone]

 

 Email Address  [email or web address]

 

 Preferred Language  en

 

 Marital Status  Not  or 

 

 Rastafari Affiliation  Unknown

 

 Race  White

 

 Ethnic Group  Not  or 









Author







 Name  Nathan Ayers MD

 

 Address  [address]



   [place] [address]









Problems







 Active Problems  Provider  Date

 

 [facility]  [facility]









 Note: c-pap









 Coronary arteriosclerosis  Eliseo Andrew M.D., [facility]  Onset: 2013

 

 Benign essential hypertension  Eliseo Andrew M.D., [facility]  Onset: 2013

 

 Pure hypercholesterolemia  Eliseo Andrew M.D., [facility]  Onset: 2013

 

 Chronic obstructive lung disease  Eliseo Andrew M.D., [facility]  Onset: 

 

 [facility]  [facility]

 

 [facility] tolerance  [facility]  [facility]









 Note: noted 









 Gastroesophageal reflux disease  Yue Stark FNP  Onset: 2011

 

 Hyperlipidemia  Eliseo Andrew M.D.,  Onset: 2016



   [facility]  

 

 History of adenomatous polyp of colon  [facility]ina [facility]redondo [facility]  Onset: 10/25/
2016









 Note: hi grade, tubular 









 [facility]  [facility]: [facility]









 Note: AVM, 2018









 [facility]ostridial [facility]ic [facility]  [facility]: [facility]

 

 Chronic diastolic heart failure  Eliseo Andrew M.D.,  Onset: 2018



   [facility]  

 

 Atherosclerotic heart disease of  Elisoe Andrew M.D.,  Onset: 2018



 native coronary artery with  FACC  



 unspecified angina pectoris    







Social History







 Type  Date  Description  Comments

 

 Birth Sex    Unknown  

 

 Cigarette Use    Pack Years -  40  

 

 Tobacco Use  Start: Unknown End:  Quit  



   Unknown    

 

 Smoking Status  Reviewed: 20  Quit  

 

 Smokeless Tobacco    Never Used Smokeless Tobacco  

 

 ETOH Use    Consumes 2 glasses of wine  



     per week  

 

 Tobacco Use  Start: Unknown End:  Patient is a former smoker  QUIT 



   Unknown    

 

 Recreational Drug Use    Denies Drug Use  







Allergies, Adverse Reactions, Alerts







 Active Allergies  Reaction  Severity  Comments  Date

 

 Tetanus        2009

 

 Pseudoephedrine        10/04/2016

 

 Sudafed        2009

 

 Lipitor  MYALGIAS      2015

 

 Adhesives  contact dermatitis      2016







Medications







 Active Medications  SIG  Qnty  Indications  Ordering  Date



         Provider  

 

 Ramipril  1 cap by mouth  90caps  R03.0  Jimbo Garcia,  2018



         1.25mg  every day      M.D.  



 Capsules          



           

 

 Fluticasone  Spray 2 Sprays In  48units    Jimbo Garcia,  2018



 Propionate  Each Nostril Once      M.D.  



           50mcg/Act  Daily        



 Suspension          



           

 

 Incruse Ellipta  Inhale 1  30units    Jimbo Garcia,  10/24/2018



   Inhalation Every      M.D.  



 62.5mcg/Inh Aerosol  Day        



           

 

 Duoneb  as needed      Unknown  2018



           



 0.5-2.5(3)mg/3ML          



 Solution          



           

 

 Furosemide  Take 1 Tablet By  60tabs  I50.32  Lynne Harp  2018



           20mg  Mouth Every Day      Edwina, JERI,  



 Tablets  With Second Tablet      FNP  



   as Needed For        



   Edema        

 

 Advair Diskus  take 1 puff twice  3month  J44.9  Kali Loco MD  2018



   daily. rinse mouth        



 500-50mcg/Dose  after use.        



 Aerosol          



           

 

 Probiotic Daily  1 by mouth Twice  60caps    Chase Marquez,  10/21/2016



   Daily      DO  



 Capsules          



           

 

 Nebulizer  Diagnosis: COPD      Kali Loco MD  2016



 Compressor/Dualfilte          



 r/7' Tubing/Aerosol          



 T/Mthpiece          



            Kit          



           

 

 Daliresp  take one tablet by  90tabs    Kali Loco MD  2016



         500mcg  mouth every day        



 Tablets          



           

 

 Proair HFA  inhale two puffs  25.5gm    GarciaJimbo connell,  2015



   by mouth every 4      M.D.  



 108(90Base) mcg/Act  hours as needed        



 Aerosol          



           

 

 Oxygen  oxygen @ 4 liters      Jimbo Garcia,  2015



   at all times      M.D.  



   (except when        



   walking outside        



   may raise to 5        



   liters)        

 

 Pravastatin Sodium  Take One Tablet By  90tabs  E78.0  GarciaTor connellshan,  2015



   Mouth Every Day      M.D.  



 40mg Tablets          



           

 

 Montelukast Sodium  Take 1 Tablet By  90tabs    GarciaTor connellshan,  2015



   Mouth Every Day      M.D.  



 10mg Tablets          



           

 

 Calcium 500/D  po daily per pt      Lynne Harp  



         JERI Bland,  



 [id number]        FNP  



 Chewtabs          



           

 

 Pantoprazole Sodium  1 po daily  180tabs    Garcia, Jimbo,  



         M.D.  



 40mg Tablets DR          



           

 

 Vitamin C  1 by mouth every      Unknown  



          1000mg  day        



 Tablets          



           

 

 Aspir-81  1 by mouth every      Unknown  



         81mg Tablets  day        



 DR          

 

 Vitamin B12  1 by mouth once a      Unknown  



            1000mcg  day        



 Tablets ER          



           

 

 Multi Vitamin  1 by mouth every      Unknown  



   day        



 Tablets          



           

 

 Benefiber  1 tablespoon with      Unknown  



           Powder  large glass of        



   water every day        

 

 Prednisone  tapering dose 3      Unknown  



           10mg  days of 3 daily        



 Tablets  then 3 days of 2        



   and then 3 days of        



   1        









 History Medications









 Dulcolax  4 tablets taken a  4tabs  Jose A Latham MD  2019 -



            5mg  8pm the day before        10/16/2019



 Tablets DR  the procedure        



           

 

 Citroma  drink 1 bottle at  296ml  Jose A Latham MD  2019 -



   12pm (noon)the day        10/16/2019



 1.745GM/30ML  before the        



 Solution  procedure        



           

 

 Plenvu  take half the  500ml  Jose A Latham MD  2019 -



          140gm  bottle the day        10/16/2019



 Solution Rec  before the exam        



   half the morning        







Immunizations







 CPT Code  Status  Date  Vaccine  Reaction  Lot #

 

 U-Flu  Given  2018  Influenza,Unspecified    

 

 50307  Given  2018  Influenza High Dose    

 

 22123  Given  2017  Influenza High Dose  received at [facility].  

 

 24125  Given  2017  Influenza High Dose    

 

   Given  10/09/2016  Influenza Vaccine (Fluzone)    



       Age 3 And Older    

 

 42509  Given  10/16/2015  Pneumococcal Conjugate    [id number]



       Vaccine 13 Valent For    



       Intramuscular Use    

 

   Given  10/02/2015  Influenza Vaccine (Fluzone)    [id number]



       Age 3 And Older    

 

 81119  Given  10/17/2014  flu vaccination    

 

 28464  Given  2013  flu vaccination    

 

 32674  Given  2012  flu vaccination    

 

 53580  Given  10/17/2011  flu vaccination    

 

 10882  Given  10/19/2010  flu vaccination    

 

 14239  Given  2010  H1N1 Immuniation    



       Adminstration    

 

 77840  Given  2009  flu vaccination    

 

 73947  Given  10/28/2008  Pneumovax Injection    

 

 42967  Given  10/28/2008  flu vaccination    







Vital Signs







 Date  Vital  Result  Comment

 

 2020 10:28am  BP Systolic Sitting Right Arm  132 mmHg  









 BP Diastolic Sitting Right Arm  77 mmHg  

 

 Heart Rate  100 /min  

 

 Respiratory Rate  24 /min  

 

 Height  62 inches  5'2"

 

 Weight  220.00 lb  

 

 BMI (Body Mass Index)  40.2 kg/m2  

 

 BSA (Body Surface Area)  1.99 m2  

 

 Ideal body weight in kilograms  50 kg  

 

 O2 % BldC Oximetry  96 %  nc/4l









 10/23/2019  1:15pm  BP Systolic Sitting Right Arm  122 mmHg  









 BP Diastolic Sitting Right Arm  78 mmHg  

 

 Body Temperature  97.6 F  

 

 Heart Rate  91 /min  

 

 Respiratory Rate  17 /min  

 

 Height  62 inches  5'2"

 

 Weight  219.00 lb  

 

 BMI (Body Mass Index)  40.1 kg/m2  

 

 BSA (Body Surface Area)  1.99 m2  

 

 Ideal body weight in kilograms  50 kg  

 

 O2 % BldC Oximetry  97 %  







Results







 Test  Acquired Date  Facility  Test  Result  H/L  Range  Note

 

 Iron-Tibc-%Sat  2019  [facility]  Serum Iron  53 g/dL  Normal    1



     [address] [place] [address] [phone]          









 Total Iron Binding Capacity  586 g/dL  High  250-450  

 

 Transferrin %Saturation  9 %  Low  12-57  









 1  D50.9







Procedures







 Date  Code  Description  Status

 

 2019  95018  Colonoscopy With Biopsy  Completed

 

 2019  68886  EGD With Biopsy  Completed

 

 2019  53296757  Colonoscopy  Completed

 

 2019  12779518  Mammogram  Completed

 

 2018  52895149  Mammogram  Completed

 

 2015  81589743  Colonoscopy  Completed

 

 2014  96627371  Colonoscopy  Completed

 

 2010  687789378  Bone Mineral Density Test  Completed

 

 10/26/2004  26975503  Colonoscopy  Completed







[employer]







 Description

 

 No Information Available







Encounters







 Type  Date  Location  Provider  Dx  Diagnosis

 

 Office Visit  10/23/2019  GI  Jose A Faust MD  K57.30  Dvrtclos of lg int w
/o



   1:15p        perforation or abscess



           w/o bleeding









 K63.5  Polyp of colon

 

 D50.9  Iron deficiency anemia, unspecified

 

 K29.50  Unspecified chronic gastritis without bleeding









 Office Visit  2019  1:30p  GI  Jose A Faust MD  K57.92  Dvtrcli of 
intest, part



           unsp, w/o perf or



           abscess w/o bleed









 D50.9  Iron deficiency anemia, unspecified







Assessments







 Date  Code  Description  Provider

 

 2020  J44.9  Chronic obstructive pulmonary disease,  Nathan Ayers MD



     unspecified  

 

 2020  G47.33  Obstructive sleep apnea (adult) (pediatric)  Nathan Ayers MD

 

 2020  J96.11  Chronic respiratory failure with hypoxia  Nathan Ayers MD

 

 2020  R91.1  Solitary pulmonary nodule  Nathan Ayers MD

 

 2020  J44.1  Chronic obstructive pulmonary disease with  Jazmyn Sabillon MD



     (acute) exacerbation  

 

 2020  J96.21  Acute and chronic respiratory failure with  Jazmyn Sablilon MD



     hypoxia  

 

 2020  J06.9  Acute upper respiratory infection, unspecified  Jazmyn Sabillon MD

 

 2020  R00.0  Tachycardia, unspecified  Jazmyn Sabillon MD

 

 2020  J44.1  Chronic obstructive pulmonary disease with  Jazmyn Sabillon MD



     (acute) exacerbation  

 

 2020  J96.21  Acute and chronic respiratory failure with  Jazmyn Sabillon MD



     hypoxia  

 

 2020  J06.9  Acute upper respiratory infection, unspecified  Jazmyn Sabillon MD

 

 2020  R00.0  Tachycardia, unspecified  Jazmyn Sabillon MD

 

 2020  J44.1  Chronic obstructive pulmonary disease with  Jazmyn Sabillon MD



     (acute) exacerbation  

 

 2020  J96.21  Acute and chronic respiratory failure with  Jazmyn Sabillon MD



     hypoxia  

 

 2020  J06.9  Acute upper respiratory infection, unspecified  Jazmyn Sabillon MD

 

 2020  R00.0  Tachycardia, unspecified  Jazmyn Sabillon MD

 

 2020  J44.1  Chronic obstructive pulmonary disease with  Jazmyn Sabillon MD



     (acute) exacerbation  

 

 2020  J96.21  Acute and chronic respiratory failure with  Jazmyn Sabillon MD



     hypoxia  

 

 2020  J06.9  Acute upper respiratory infection, unspecified  Jazmyn Sabillon MD

 

 2020  R00.0  Tachycardia, unspecified  Jazmyn Sabillon MD

 

 10/23/2019  K57.30  Diverticulosis of large intestine without  Jose A Faust MD



     perforation or abscess without bleeding  

 

 10/23/2019  K63.5  Polyp of colon  Jose A Faust MD

 

 10/23/2019  D50.9  Iron deficiency anemia, unspecified  Jose A Faust MD

 

 10/23/2019  K29.50  Unspecified chronic gastritis without bleeding  Jose A Faust MD

 

 2019  K57.30  Diverticulosis of large intestine without  Jose A Faust MD



     perforation or abscess without bleeding  

 

 2019  D12.0  Benign neoplasm of cecum  Jose A Faust MD

 

 2019  K29.50  Unspecified chronic gastritis without bleeding  Jose A Faust MD

 

 2019  K57.92  Diverticulitis of intestine, part unspecified,  Jose A Faust MD



     without perforation or abscess without bleeding  

 

 2019  D50.9  Iron deficiency anemia, unspecified  Jose A Faust MD







Plan of Treatment

Future Appointment(s):2020 10:30 am - Nicole Enrique MD at [email or web address]
/05/2020 - Nathan Ayers MDJ44.9 Chronic obstructive pulmonary disease, 
ipydcxsqedcG72.33 Obstructive sleep apnea (adult) (pediatric)J96.11 Chronic 
respiratory failure with ddovuxlU08.1 Solitary pulmonary noduleNew Xrays:CT, 
Chest Low Dose For Lung Cancer Screening W/ Out Contrast, Ordered: 20



Functional Status







 Functional Condition  Comment  Date  Status

 

 Glasses      Active

 

 Independent with all ADL's      Active

 

 Complete Dentures      Active







Mental Status







 Description

 

 No Information Available







Referrals







 Description

 

 No Information Available

## 2023-10-11 NOTE — UC
Respiratory Complaint HPI





- HPI Summary


HPI Summary: 


69-year-old female with significant history of COPD presents with 4 day history 

of productive cough and increased shortness of breath.  States cough has been 

productive for clear sputum.  States she has had to use her rescue inhaler 2-3 

times a day.  Patient is oxygen dependent wearing 4 L/m at all times.  States 2 

weeks ago she completed a course of Cipro and Flagyl for a flareup of her 

diverticulitis. Denies fever, chills, nasal congestion, sore throat, chest pain

, palpitations, abdominal pain, nausea, or vomiting. 








- History of Current Complaint


Chief Complaint: UCRespiratory


Stated Complaint: CONGESTON/COUGH


Time Seen by Provider: 01/27/20 12:35


Hx Obtained From: Patient


Hx Last Menstrual Period: n/a


Pain Intensity: 2





- Allergies/Home Medications


Allergies/Adverse Reactions: 


 Allergies











Allergy/AdvReac Type Severity Reaction Status Date / Time


 


pseudoephedrine Allergy  Tachycardia Verified 01/27/20 12:17





[From Sudafed]     


 


Tetanus Vaccines and Toxoid Allergy  Rash Verified 01/27/20 12:17














PMH/Surg Hx/FS Hx/Imm Hx


Endocrine History: Dyslipidemia


Cardiovascular History: Hypertension


Respiratory History: COPD


GI/ History: Gastroesophageal Reflux





- Surgical History


Surgical History: Yes


Surgery Procedure, Year, and Place: Tonsilectomy, knee surgery, tubal, 

hysterectomy, ehsan





- Family History


Known Family History: Positive: Cardiac Disease, Hypertension, Diabetes





- Social History


Occupation: Retired


Lives: With Family


Alcohol Use: Weekly


Substance Use Type: None


Smoking Status (MU): Former Smoker


Type: Cigarettes


Amount Used/How Often: 1 PPD


Length of Time of Smoking/Using Tobacco: 46 Years


Have You Smoked in the Last Year: No


When Did the Patient Quit Smoking/Using Tobacco: 4 YRS





- Immunization History


Most Recent Influenza Vaccination: October 2015


Most Recent Pneumonia Vaccination: October 2015





Review of Systems


All Other Systems Reviewed And Are Negative: Yes


Constitutional: Negative: Fever, Chills


Eyes: Negative: Drainage, Eye Redness


ENT: Negative: Sore Throat, Ear Ache, Nasal Discharge, Sinus Congestion, Sinus 

Pain/Tenderness


Respiratory: Positive: Shortness Of Breath, Cough


Cardiovascular: Negative: Palpitations, Chest Pain


Gastrointestinal: Negative: Abdominal Pain, Vomiting, Nausea


Genitourinary: Positive: Negative


Musculoskeletal: Positive: Negative


Neurological: Positive: Negative


Is Patient Immunocompromised?: No





Physical Exam





- Summary


Physical Exam Summary: 


GENERAL APPEARANCE: Well developed, well nourished, alert and cooperative, and 

appears to be in no acute distress.





EYES: Conjunctiva clear. No drainage.





EARS: External auditory canals and tympanic membranes clear, hearing grossly 

intact.





NOSE: No nasal discharge.





THROAT: Pharynx normal No tonsilar inflammation, swelling, exudate, or lesions. 

Uvula midline.





NECK: Neck supple, non-tender without lymphadenopathy.





CARDIAC: Normal S1 and S2. No S3, S4 or murmurs. Rhythm is regular. There is no 

peripheral edema, cyanosis or pallor. Extremities are warm and well perfused. 

Capillary refill is less than 2 seconds. Peripheral pulses intact.





LUNGS: Clear to auscultation without rales, rhonchi, wheezing or diminished 

breath sounds.





ABDOMEN: Positive bowel sounds. Soft, nondistended, nontender. No guarding or 

rebound. No masses or hepatosplenomegally.





MUSKULOSKELETAL: ROM intact to all extremities. No joint erythema or 

tenderness. Normal muscular development. Normal gait.





SKIN: Skin normal color, texture and turgor with no lesions or eruptions.





Triage Information Reviewed: Yes


Vital Signs: 


 Initial Vital Signs











Temp  98.7 F   01/27/20 12:22


 


Pulse  90   01/27/20 12:22


 


Resp  22   01/27/20 12:22


 


BP  141/80   01/27/20 12:22


 


Pulse Ox  100   01/27/20 12:22











Vital Signs Reviewed: Yes





Respiratory Course/Dx





- Course


Course Of Treatment: 


69-year-old female with significant history of COPD presents with 4 day history 

of productive cough and increased shortness of breath.  States cough has been 

productive for clear sputum.  States she has had to use her rescue inhaler 2-3 

times a day.  Patient is oxygen dependent wearing 4 L/m at all times.  States 2 

weeks ago she completed a course of Cipro and Flagyl for a flareup of her 

diverticulitis. Denies fever, chills, nasal congestion, sore throat, chest pain

, palpitations, abdominal pain, nausea, or vomiting.  Afebrile.  Hypertensive 

otherwise vital signs stable.  Patient had mildly diminished bilateral breath 

sounds without wheezing, rales, or rhonchi, productive cough for clear sputum, 

and otherwise unremarkable exam.  With the patient's significant history of 

COPD and frequent bronchitis will place her on Augmentin 875 mg twice daily 10 

days and have her continue with symptomatic treatment.  She is to follow up 

with her primary care provider in 3 days if symptoms do not improve.  

Anticipatory guidance and warning symptoms were reviewed with the patient.  

Verbalizes understanding and agrees with plan of care.








- Differential Dx/Diagnosis


Differential Diagnosis/HQI/PQRI: Bronchitis, Exacerbation Of COPD, Lower Resp 

Infection


Provider Diagnosis: 


 Acute bronchitis, COPD (chronic obstructive pulmonary disease)








Discharge ED





- Sign-Out/Discharge


Documenting (check all that apply): Patient Departure


All imaging exams completed and their final reports reviewed: No Studies





- Discharge Plan


Condition: Stable


Disposition: HOME


Prescriptions: 


Amoxicillin/Clavulanate TAB* [Augmentin *] 875 mg PO BID #20 tab


Patient Education Materials:  Acute Bronchitis (ED), COPD (Chronic Obstructive 

Pulmonary Disease) (ED)


Referrals: 


Gina Arredondo PA [Primary Care Provider] - 3 Days (If no improvement.)


Additional Instructions: 


Your history and exam are consistent with acute bronchitis which is most often 

caused by a viral infection however with your history of COPD and frequent 

bronchitis we will start you on an antibiotic.





Start Augmentin 875 mg twice a day for 10 days.  Take with food to avoid upset 

stomach.  Be sure to complete the entire course even if feeling better.





Continue to use your inhalers as directed.





Get plenty of rest.





Drink plenty of fluids.





Run a cool mist humidifer in your room at night.





Take over the counter acetaminophen (Tylenol) or ibuprofen (Advil, Motrin) 

according to directions as needed for pain or fever.





Follow up with your primary care provider in 3 days if symptoms do not improve.





Seek immediate medical attention in the emergency room if you have fever 

greater than 100.5 F despite taking acetaminophen or ibuprofen, have chest pain

, difficulty breathing, or have any worsening of symptoms.





- Billing Disposition and Condition


Condition: STABLE


Disposition: Home





- Attestation Statements


Provider Attestation: 





This patient was not seen by me.


I was available for consult.


Chart reviewed


EBONI Subjective:       Patient ID: Bel Loredo is a 68 y.o. female.    Chief Complaint: No chief complaint on file.      HPI     Mrs Loredo returns today for follow-up.  I had last seen her in October 2022.  Briefly, she is a  68-year-old  female who underwent a right mastectomy and sentinel node biopsy for two right sided grade I carcinomas, that were both ER positive, LA positive, and HER-2 negative,  measuring 6 and 7 mm respectively.  Resection margins were clear, while the sentinel lymph node was negative.    She was started on anastrazole in June 2017, and completed her 5 years of adjuvant hormonal therapy in June of this year.    She had a mammogram earlier today that was read as BI-RADS 1.  Her DXA scan today shows normal bone density.  She underwent a [facility] BSO on December first, 2022.  There was no evidence of malignancy.    Review of Systems    Overall she feels well,, and her ECOG PS is 1.  She denies any depression, easy bruising, fevers, chills, night  sweats, weight loss, nausea, vomiting, diarrhea, constipation, diplopia, blurred vision, headache, chest pain, palpitations, shortness of breath, left breast pain, upper extremity pain or difficulty ambulating.  The remainder of the ten-point ROS, including general, skin, lymph, H/N, cardiorespiratory, GI, , Neuro, Endocrine, and psychiatric is negative.       Objective:      Physical Exam    She is alert, oriented to time, place, person, pleasant, well      nourished, in no acute physical distress.  She is here with her daughter                            VITAL SIGNS:  Reviewed                                      HEENT:  Normal.  There are no nasal, oral, lip, gingival, auricular, lid,    or conjunctival lesions.  Mucosae are moist and pink, and there is no        thrush.  Pupils are equal, reactive to light and accommodation.              Extraocular muscle movements are intact.  Maxillary teeth are missing while the mandibular dentition  is fair.                                      NECK:  Supple without JVD, adenopathy, or thyromegaly.                       LUNGS:  Clear to auscultation without wheezing, rales, or rhonchi.           CARDIOVASCULAR:  Reveals an S1, S2, no murmurs, no rubs, no gallops.         ABDOMEN:  Soft, nontender, without organomegaly.  Bowel sounds are    present.   Multiple scans from the laparoscopic attempt and the open laparotomy are identified.                                                                   EXTREMITIES:  No cyanosis, clubbing, or edema.  A scar from the recent right knee replacement surgery is identified.                                BREASTS:  She is status post right mastectomy with a prosthesis in place.   There are no masses in her left breast or the right-sided reconstruction.   She is s/p left reduction mammoplasty.     Her incisions have healed nicely.    No masses are palpated                                LYMPHATIC:  There is no cervical, axillary, or supraclavicular adenopathy.   SKIN:  Warm and moist, without petechiae, rashes, induration, or ecchymoses.           NEUROLOGIC:  DTRs are 0-1+ bilaterally, symmetrical, motor function is 5/5,  and cranial nerves are  within normal limits    Assessment:       1. Malignant neoplasm of upper-outer quadrant of right female breast, s/p mastectomy and 5 years of adjuvant hormonal therapy, clinically OLESYA, doing well     2.   Plan:        I had a long discussion with her.    In regards to the breast cancer, she has completed her adjuvant hormonal therapy and she remains in remission.  I will see her again in a year with another left mammogram.  Her multiple questions were answered to her satisfaction.      Route Chart for Scheduling    Med Onc Chart Routing      Follow up with physician 1 year.   Follow up with HENRRY    Infusion scheduling note    Injection scheduling note    Labs    Imaging Mammogram      Pharmacy appointment    Other referrals